# Patient Record
Sex: FEMALE | Race: BLACK OR AFRICAN AMERICAN | NOT HISPANIC OR LATINO | Employment: UNEMPLOYED | URBAN - METROPOLITAN AREA
[De-identification: names, ages, dates, MRNs, and addresses within clinical notes are randomized per-mention and may not be internally consistent; named-entity substitution may affect disease eponyms.]

---

## 2017-02-16 ENCOUNTER — OFFICE VISIT (OUTPATIENT)
Dept: FAMILY MEDICINE | Facility: CLINIC | Age: 42
End: 2017-02-16

## 2017-02-16 VITALS
DIASTOLIC BLOOD PRESSURE: 86 MMHG | BODY MASS INDEX: 32.49 KG/M2 | SYSTOLIC BLOOD PRESSURE: 136 MMHG | TEMPERATURE: 98.7 F | WEIGHT: 226.4 LBS

## 2017-02-16 DIAGNOSIS — I10 BENIGN ESSENTIAL HYPERTENSION: ICD-10-CM

## 2017-02-16 DIAGNOSIS — R05.9 COUGH: ICD-10-CM

## 2017-02-16 DIAGNOSIS — J30.2 SEASONAL ALLERGIC RHINITIS, UNSPECIFIED ALLERGIC RHINITIS TRIGGER: ICD-10-CM

## 2017-02-16 DIAGNOSIS — R07.0 THROAT PAIN: Primary | ICD-10-CM

## 2017-02-16 DIAGNOSIS — J02.0 STREP THROAT: ICD-10-CM

## 2017-02-16 LAB — S PYO AG THROAT QL IA.RAPID: POSITIVE

## 2017-02-16 RX ORDER — AMOXICILLIN 500 MG/1
500 CAPSULE ORAL 3 TIMES DAILY
Qty: 30 CAPSULE | Refills: 0 | Status: SHIPPED | OUTPATIENT
Start: 2017-02-16 | End: 2017-03-03

## 2017-02-16 RX ORDER — HYDROCHLOROTHIAZIDE 25 MG/1
12.5 TABLET ORAL DAILY
Qty: 30 TABLET | Refills: 6 | Status: SHIPPED | OUTPATIENT
Start: 2017-02-16 | End: 2017-04-26

## 2017-02-16 RX ORDER — FLUTICASONE PROPIONATE 50 MCG
1-2 SPRAY, SUSPENSION (ML) NASAL DAILY
Qty: 16 G | Refills: 6 | Status: SHIPPED | OUTPATIENT
Start: 2017-02-16 | End: 2017-03-27

## 2017-02-16 NOTE — PROGRESS NOTES
HPI:       Juanita Salgado is a 42 year old who presents for the following  Patient presents with:  Cough: 2 days, couple of her kids had positive strep  Allergies: flare up  Medication Request: flutonaise, is not effective  Derm Problem: raised nodule on inside of left thigh, painful, present for almost 1 week      Acute Illness   Concerns: cough, worsening allergy  When did it start? 2 days  Is it getting better, worse or staying the same? worse    Fever?:  YES subjective    Chills/Sweats?: No     Headache (location?) YES forehead    Sinus Pressure?:No     Conjunctivitis?: No     Ear Pain?: No     Runny nose?: No     Congestion?:  YES feels like worsening allergy    Sore Throat?:  YES      Cough?: no     Wheeze?: No     Decreased Appetite?:  YES     Nausea?:No     Vomiting?: No     Diarrhea?:  No     Dysuria/Frequency?.:No     Fatigue/Achiness?:No     Sick/Strep Exposure?: No      Therapies Tried and outcome: Nothing    A TwitJump  was used for  this visit.      Problem, Medication and Allergy Lists were reviewed and are current.  Patient is an established patient of this clinic.         Review of Systems:   Review of Systems   Constitutional: Positive for appetite change and fever (subjective). Negative for activity change.   HENT: Positive for congestion and sore throat.    Respiratory: Negative for cough and wheezing.    All other systems reviewed and are negative.            Physical Exam:   Patient Vitals for the past 24 hrs:   BP Temp Temp src Weight   02/16/17 1335 136/86 - - -   02/16/17 1331 145/85 98.7  F (37.1  C) Oral 226 lb 6.4 oz (102.7 kg)     Body mass index is 32.49 kg/(m^2).  Vitals were reviewed and were normal     Physical Exam   Constitutional: She appears well-developed and well-nourished. No distress.   HENT:   Head: Normocephalic and atraumatic.   Right Ear: External ear normal.   Left Ear: External ear normal.   Nose: Nose normal.   Mouth/Throat: Oropharynx is clear and moist.    Eyes: Conjunctivae are normal.   Neck: Normal range of motion. Neck supple.   Cardiovascular: Normal rate, regular rhythm and normal heart sounds.    No murmur heard.  Pulmonary/Chest: Effort normal and breath sounds normal. She has no wheezes. She has no rales.   Abdominal: Soft. Bowel sounds are normal. She exhibits no distension. There is no tenderness.   Lymphadenopathy:     She has cervical adenopathy.   Skin: She is not diaphoretic.         Results:     Rapid strep test positive  Assessment and Plan       ## Strep throat. Centor criteria 3/4  - Strep Screen Rapid (Group) ([facility]): POSITIVE  - amoxicillin (AMOXIL) 500 MG capsule; Take 1 capsule (500 mg) by mouth 3 times daily  Dispense: 30 capsule; Refill: 0    Refills:     ## Seasonal allergic rhinitis, unspecified allergic rhinitis trigger  - fluticasone (FLONASE) 50 MCG/ACT spray; Spray 1-2 sprays into both nostrils daily  Dispense: 16 g; Refill: 6    ## Benign essential hypertension  - hydrochlorothiazide (HYDRODIURIL) 25 MG tablet; Take 0.5 tablets (12.5 mg) by mouth daily  Dispense: 30 tablet; Refill: 6    Follow up plan: as needed      There are no discontinued medications.     Options for treatment and follow-up care were reviewed with the patient. Juanita Salgado  engaged in the decision making process and verbalized understanding of the options discussed and agreed with the final plan.    Rosa Simental MD  [facility],  G2 Family Medicine Resident  #[id number]

## 2017-02-16 NOTE — MR AVS SNAPSHOT
After Visit Summary   2/16/2017    Juanita Salgado    MRN: [id number]           Patient Information     Date Of Birth          1975        Visit Information        Provider Department      2/16/2017 1:20 PM Rosa Simental MD [facility]        Today's Diagnoses     Throat pain    -  1    Cough        Benign essential hypertension        Strep throat        Seasonal allergic rhinitis, unspecified allergic rhinitis trigger          Care Instructions    Here is the plan from today's visit:    ## Strep throat  - amoxicillin (AMOXIL) 500 MG capsule; Take 1 capsule (500 mg) by mouth 3 times daily  Dispense: 30 capsule; Refill: 0  - Strep Screen Rapid (Group) ([facility]): POSITIVE    ## Seasonal allergic rhinitis, unspecified allergic rhinitis trigger  - fluticasone (FLONASE) 50 MCG/ACT spray; Spray 1-2 sprays into both nostrils daily  Dispense: 16 g; Refill: 6      ## Benign essential hypertension    - hydrochlorothiazide (HYDRODIURIL) 25 MG tablet; Take 0.5 tablets (12.5 mg) by mouth daily  Dispense: 30 tablet; Refill: 6    Please call or return to clinic if your symptoms don't go away.    Follow up plan: as needed    Thank you for coming to [facility] today.  Lab Testing:  **If you had lab testing today and your results are reassuring or normal they will be mailed to you or sent through [employer] within 7 days.   **If the lab tests need quick action we will call you with the results.  The phone number we will call with results is # [phone] (home) . If this is not the best number please call our clinic and change the number.  Medication Refills:  If you need any refills please call your pharmacy and they will contact us.   If you need to  your refill at a new pharmacy, please contact the new pharmacy directly. The new pharmacy will help you get your medications transferred faster.   Scheduling:  If you have any concerns about today's visit or wish to schedule another  appointment please call our office during normal business hours [phone] (8-5:00 M-F)  If a referral was made to a [facility] and you don't get a call from central scheduling please call [phone].  If a Mammogram was ordered for you at [facility] call [phone] to schedule or change your appointment.  If you had an XRay/CT/Ultrasound/MRI ordered the number is [phone] to schedule or change your radiology appointment.   Medical Concerns:  If you have urgent medical concerns please call [phone] at any time of the day.    Pharyngitis: Strep (Confirmed)     You have had a positive test for strep throat. Strep throat is a contagious illness. It is spread by coughing, kissing or by touching others after touching your mouth or nose. Symptoms include throat pain which is worse with swallowing, aching all over, headache and fever. It is treated with antibiotic medication. This should help you start to feel better within 1-2 days.  Home care    Rest at home. Drink plenty of fluids to avoid dehydration.    No work or school for the first 2 days of taking the antibiotics. After this time, you will not be contagious. You can then return to school or work if you are feeling better.     The antibiotic medication must be taken for the full 10 days, even if you feel better. This is very important to ensure the infection is treated. It is also important to prevent drug-resistent organisms from developing. If you were given an antibiotic shot, no more antibiotics are needed.    You may use acetaminophen (Tylenol) or ibuprofen (Motrin, Advil) to control pain or fever, unless another medicine was prescribed for this. (NOTE: If you have chronic liver or kidney disease or ever had a stomach ulcer or GI bleeding, talk with your doctor before using these medicines.)    Throat lozenges or sprays (such as Chloraseptic) help reduce pain. Gargling with warm salt water will also reduce throat  pain. Dissolve 1/2 teaspoon of salt in 1 glass of warm water. This may be useful just before meals.     Soft foods are okay. Avoid salty or spicy foods.  Follow-up care  Follow up with your healthcare provider or our staff if you are not improving over the next week.  When to seek medical advice  Call your healthcare provider right away if any of these occur:    Fever as directed by your doctor     New or worsening ear pain, sinus pain, or headache    Painful lumps in the back of neck    Stiff neck    Lymph nodes are getting larger or becoming soft in the middle    Inability to swallow liquids, excessive drooling, or inability to open mouth wide due to throat pain    Signs of dehydration (very dark urine or no urine, sunken eyes, dizziness)    Trouble breathing or noisy breathing    Muffled voice    New rash    [id number] [employer]. [address] [place] [address]. All rights reserved. This information is not intended as a substitute for professional medical care. Always follow your healthcare professional's instructions.              Follow-ups after your visit        Your next 10 appointments already scheduled     Mar 10, 2017 11:00 AM CST   (Arrive by 10:45 AM)   Return Visit with BETTY Isabel [facility] Neurology ([facility])    [address] [facility]  [address] [facility] [id number] [phone]            Apr 06, 2017  1:15 PM CDT   RETURN GENERAL with Everett Anguiano MD   Eye Clinic ([facility])    [facility]  [address] [facility]  [address] [address]  [facility] [id number] [phone]              Who to contact     Please call your clinic at [phone] to:    Ask questions about your health    Make or cancel appointments    Discuss your medicines    Learn about your test results    Speak to your doctor   If you have compliments or concerns about an experience at your clinic, or if you wish to file a  complaint, please contact [facility] Patient Relations at [phone] or email us at [email or web address]         Additional Information About Your Visit        [employer] Information     [employer] is an electronic gateway that provides easy, online access to your medical records. With [employer], you can request a clinic appointment, read your test results, renew a prescription or communicate with your care team.     To sign up for [employer] visit the website at [email or web address]   You will be asked to enter the access code listed below, as well as some personal information. Please follow the directions to create your username and password.     Your access code is: [email or web address]  Expires: 3/26/2017  6:30 AM     Your access code will  in 90 days. If you need help or a new code, please contact your [facility] or call [phone] for assistance.        Care EveryWhere ID     This is your Care EveryWhere ID. This could be used by other organizations to access your [place] medical records  [id number]        Your Vitals Were     Temperature BMI (Body Mass Index)                98.7  F (37.1  C) (Oral) 32.49 kg/m2           Blood Pressure from Last 3 Encounters:   17 136/86   16 (!) 141/91   16 126/84    Weight from Last 3 Encounters:   17 226 lb 6.4 oz (102.7 kg)   16 231 lb 14.4 oz (105.2 kg)   16 224 lb 6.4 oz (101.8 kg)              We Performed the Following     Strep Screen Rapid (Group) ([facility])          Today's Medication Changes          These changes are accurate as of: 17  2:10 PM.  If you have any questions, ask your nurse or doctor.               Start taking these medicines.        Dose/Directions    amoxicillin 500 MG capsule   Commonly known as:  AMOXIL   Used for:  Strep throat   Started by:  Rosa Simental MD        Dose:  500 mg   Take 1 capsule (500 mg) by mouth 3 times daily    Quantity:  30 capsule   Refills:  0            Where to get your medicines      These medications were sent to [facility] [place], [place] - [address] [facility] [address]     Phone:  [phone]     amoxicillin 500 MG capsule    fluticasone 50 MCG/ACT spray    hydrochlorothiazide 25 MG tablet                Primary Care Provider Office Phone # Fax #    Aminata Tamie Molina [phone]       [facility] [address]  [facility] [address]        Thank you!     Thank you for choosing [facility]  for your care. Our goal is always to provide you with excellent care. Hearing back from our patients is one way we can continue to improve our services. Please take a few minutes to complete the written survey that you may receive in the mail after your visit with us. Thank you!             Your Updated Medication List - Protect others around you: Learn how to safely use, store and throw away your medicines at www.disposemymeds.org.          This list is accurate as of: 2/16/17  2:10 PM.  Always use your most recent med list.                   Brand Name Dispense Instructions for use    acetaminophen 500 MG tablet    TYLENOL    100 tablet    Take 2 tablets (1,000 mg) by mouth every 6 hours as needed for mild pain       Adult Blood Pressure Cuff Lg Kit     1 kit    1 Device as needed       amitriptyline 25 MG tablet    ELAVIL    30 tablet    Take 1 tablet (25 mg) by mouth At Bedtime       amLODIPine 5 MG tablet    NORVASC    30 tablet    Take 1 tablet (5 mg) by mouth daily       amoxicillin 500 MG capsule    AMOXIL    30 capsule    Take 1 capsule (500 mg) by mouth 3 times daily       ARTIFICIAL TEAR OP      Apply 1 drop to eye daily as needed       atorvastatin 80 MG tablet    LIPITOR    90 tablet    Take 1 tablet (80 mg) by mouth daily       carboxymethylcellulose 0.5 % Soln ophthalmic solution    REFRESH PLUS    1 Bottle    Place 1  drop into both eyes 3 times daily as needed for dry eyes       cetirizine 10 MG tablet    zyrTEC    30 tablet    Take 1 tablet (10 mg) by mouth every evening       fluticasone 50 MCG/ACT spray    FLONASE    16 g    Spray 1-2 sprays into both nostrils daily       hydrochlorothiazide 25 MG tablet    HYDRODIURIL    30 tablet    Take 0.5 tablets (12.5 mg) by mouth daily       ibuprofen 800 MG tablet    ADVIL/MOTRIN    60 tablet    Take 1 tablet (800 mg) by mouth every 6 hours as needed for moderate pain       ketotifen 0.025 % Soln ophthalmic solution    ZADITOR    1 Bottle    Place 1 drop into both eyes every 12 hours       loratadine 10 MG tablet    CLARITIN    30 tablet    Take 1 tablet (10 mg) by mouth daily       multivitamin, therapeutic with minerals Tabs tablet     100 tablet    Take 1 tablet by mouth daily       riboflavin 400 MG Caps     30 capsule    Take 400 mg by mouth daily       SUMAtriptan 25 MG tablet    IMITREX    10 tablet    Take 1 tablet (25 mg) by mouth at onset of headache for migraine May repeat in 2 hours. Max 8 tablets/24 hours.       vitamin D 2000 UNITS tablet     100 tablet    Take 1 tablet by mouth daily

## 2017-02-16 NOTE — PATIENT INSTRUCTIONS
Here is the plan from today's visit:    ## Strep throat  - amoxicillin (AMOXIL) 500 MG capsule; Take 1 capsule (500 mg) by mouth 3 times daily  Dispense: 30 capsule; Refill: 0  - Strep Screen Rapid (Group) ([facility]): POSITIVE    ## Seasonal allergic rhinitis, unspecified allergic rhinitis trigger  - fluticasone (FLONASE) 50 MCG/ACT spray; Spray 1-2 sprays into both nostrils daily  Dispense: 16 g; Refill: 6      ## Benign essential hypertension    - hydrochlorothiazide (HYDRODIURIL) 25 MG tablet; Take 0.5 tablets (12.5 mg) by mouth daily  Dispense: 30 tablet; Refill: 6    Please call or return to clinic if your symptoms don't go away.    Follow up plan: as needed    Thank you for coming to [facility] today.  Lab Testing:  **If you had lab testing today and your results are reassuring or normal they will be mailed to you or sent through [employer] within 7 days.   **If the lab tests need quick action we will call you with the results.  The phone number we will call with results is # [phone] (home) . If this is not the best number please call our clinic and change the number.  Medication Refills:  If you need any refills please call your pharmacy and they will contact us.   If you need to  your refill at a new pharmacy, please contact the new pharmacy directly. The new pharmacy will help you get your medications transferred faster.   Scheduling:  If you have any concerns about today's visit or wish to schedule another appointment please call our office during normal business hours [phone] (8-5:00 M-F)  If a referral was made to a [facility] and you don't get a call from central scheduling please call [phone].  If a Mammogram was ordered for you at [facility] call [phone] to schedule or change your appointment.  If you had an XRay/CT/Ultrasound/MRI ordered the number is [phone] to schedule or change your radiology appointment.   Medical Concerns:  If you  have urgent medical concerns please call [phone] at any time of the day.    Pharyngitis: Strep (Confirmed)     You have had a positive test for strep throat. Strep throat is a contagious illness. It is spread by coughing, kissing or by touching others after touching your mouth or nose. Symptoms include throat pain which is worse with swallowing, aching all over, headache and fever. It is treated with antibiotic medication. This should help you start to feel better within 1-2 days.  Home care    Rest at home. Drink plenty of fluids to avoid dehydration.    No work or school for the first 2 days of taking the antibiotics. After this time, you will not be contagious. You can then return to school or work if you are feeling better.     The antibiotic medication must be taken for the full 10 days, even if you feel better. This is very important to ensure the infection is treated. It is also important to prevent drug-resistent organisms from developing. If you were given an antibiotic shot, no more antibiotics are needed.    You may use acetaminophen (Tylenol) or ibuprofen (Motrin, Advil) to control pain or fever, unless another medicine was prescribed for this. (NOTE: If you have chronic liver or kidney disease or ever had a stomach ulcer or GI bleeding, talk with your doctor before using these medicines.)    Throat lozenges or sprays (such as Chloraseptic) help reduce pain. Gargling with warm salt water will also reduce throat pain. Dissolve 1/2 teaspoon of salt in 1 glass of warm water. This may be useful just before meals.     Soft foods are okay. Avoid salty or spicy foods.  Follow-up care  Follow up with your healthcare provider or our staff if you are not improving over the next week.  When to seek medical advice  Call your healthcare provider right away if any of these occur:    Fever as directed by your doctor     New or worsening ear pain, sinus pain, or headache    Painful lumps in the back of  neck    Stiff neck    Lymph nodes are getting larger or becoming soft in the middle    Inability to swallow liquids, excessive drooling, or inability to open mouth wide due to throat pain    Signs of dehydration (very dark urine or no urine, sunken eyes, dizziness)    Trouble breathing or noisy breathing    Muffled voice    New rash    [id number] [employer]. [address] [place] [address]. All rights reserved. This information is not intended as a substitute for professional medical care. Always follow your healthcare professional's instructions.

## 2017-02-19 ASSESSMENT — ENCOUNTER SYMPTOMS
COUGH: 0
ACTIVITY CHANGE: 0
SORE THROAT: 1
FEVER: 1
APPETITE CHANGE: 1
WHEEZING: 0

## 2017-03-03 ENCOUNTER — OFFICE VISIT (OUTPATIENT)
Dept: FAMILY MEDICINE | Facility: CLINIC | Age: 42
End: 2017-03-03

## 2017-03-03 VITALS
SYSTOLIC BLOOD PRESSURE: 138 MMHG | DIASTOLIC BLOOD PRESSURE: 89 MMHG | WEIGHT: 227.4 LBS | HEART RATE: 85 BPM | OXYGEN SATURATION: 99 % | TEMPERATURE: 98.5 F | BODY MASS INDEX: 32.63 KG/M2

## 2017-03-03 DIAGNOSIS — R10.2 CHRONIC PELVIC PAIN IN FEMALE: ICD-10-CM

## 2017-03-03 DIAGNOSIS — G89.29 CHRONIC PELVIC PAIN IN FEMALE: ICD-10-CM

## 2017-03-03 DIAGNOSIS — L24.3 IRRITANT CONTACT DERMATITIS DUE TO COSMETICS: Primary | ICD-10-CM

## 2017-03-03 RX ORDER — DIAPER,BRIEF,INFANT-TODD,DISP
EACH MISCELLANEOUS
Qty: 30 G | Refills: 0 | Status: SHIPPED | OUTPATIENT
Start: 2017-03-03 | End: 2017-03-29

## 2017-03-03 NOTE — MR AVS SNAPSHOT
After Visit Summary   3/3/2017    Juanita Salgado    MRN: [id number]           Patient Information     Date Of Birth          1975        Visit Information        Provider Department      3/3/2017 11:20 AM Lake Fernando MD [facility]        Today's Diagnoses     Irritant contact dermatitis due to cosmetics    -  1    Chronic pelvic pain in female          Care Instructions    Here is the plan from today's visit    1. Irritant contact dermatitis due to cosmetics  - hydrocortisone 1 % ointment; Apply sparingly to affected area three times daily for 14 days.  Dispense: 30 g; Refill: 0    2. Chronic pelvic pain in female  - OB/GYN REFERRAL        Please call or return to clinic if your symptoms don't go away.    Follow up plan  Follow up in 3-4 weeks for hypertension and labs    Thank you for coming to [facility] today.  Lab Testing:  **If you had lab testing today and your results are reassuring or normal they will be mailed to you or sent through [employer] within 7 days.   **If the lab tests need quick action we will call you with the results.  The phone number we will call with results is # [phone] (home) . If this is not the best number please call our clinic and change the number.  Medication Refills:  If you need any refills please call your pharmacy and they will contact us.   If you need to  your refill at a new pharmacy, please contact the new pharmacy directly. The new pharmacy will help you get your medications transferred faster.   Scheduling:  If you have any concerns about today's visit or wish to schedule another appointment please call our office during normal business hours [phone] (8-5:00 M-F)  If a referral was made to a [facility] and you don't get a call from central scheduling please call [phone].  If a Mammogram was ordered for you at [facility] call [phone] to schedule or change your  appointment.  If you had an XRay/CT/Ultrasound/MRI ordered the number is [phone] to schedule or change your radiology appointment.   Medical Concerns:  If you have urgent medical concerns please call [phone] at any time of the day.  If you have a medical emergency please call 911.        Follow-ups after your visit        Additional Services     OB/GYN REFERRAL       Your provider has referred you to:  FMG: [facility] [phone]   [email or web address]    Please be aware that coverage of these services is subject to the terms and limitations of your health insurance plan.  Call member services at your health plan with any benefit or coverage questions.      Please bring the following with you to your appointment:    (1) Any X-Rays, CTs or MRIs which have been performed.  Contact the facility where they were done to arrange for  prior to your scheduled appointment.   (2) List of current medications   (3) This referral request   (4) Any documents/labs given to you for this referral                  Your next 10 appointments already scheduled     Mar 10, 2017 11:00 AM CST   (Arrive by 10:45 AM)   Return Visit with BETTY Isabel [facility] Neurology ([facility] [place])    [address] [facility] [address] [phone]            Apr 06, 2017  1:15 PM CDT   RETURN GENERAL with Everett Anguiano MD   Eye Clinic ([facility])    Graeme Garzon [facility]  [address]  [address] [address]  [facility] [id number] [phone]              Who to contact     Please call your clinic at [phone] to:    Ask questions about your health    Make or cancel appointments    Discuss your medicines    Learn about your test results    Speak to your doctor   If you have compliments or concerns about an experience at your clinic, or if you wish to file a complaint, please contact  [facility] Patient Relations at [phone] or email us at [email or web address]         Additional Information About Your Visit        [employer] Information     [employer] is an electronic gateway that provides easy, online access to your medical records. With [employer], you can request a clinic appointment, read your test results, renew a prescription or communicate with your care team.     To sign up for [employer] visit the website at [email or web address]   You will be asked to enter the access code listed below, as well as some personal information. Please follow the directions to create your username and password.     Your access code is: [email or web address]  Expires: 3/26/2017  6:30 AM     Your access code will  in 90 days. If you need help or a new code, please contact your [facility] or call [phone] for assistance.        Care EveryWhere ID     This is your Care EveryWhere ID. This could be used by other organizations to access your [place] medical records  [id number]        Your Vitals Were     Pulse Temperature Last Period Pulse Oximetry Breastfeeding? BMI (Body Mass Index)    85 98.5  F (36.9  C) (Oral) 2017 99% No 32.63 kg/m2       Blood Pressure from Last 3 Encounters:   17 138/89   17 136/86   16 (!) 141/91    Weight from Last 3 Encounters:   17 227 lb 6.4 oz (103.1 kg)   17 226 lb 6.4 oz (102.7 kg)   16 231 lb 14.4 oz (105.2 kg)              We Performed the Following     OB/GYN REFERRAL          Today's Medication Changes          These changes are accurate as of: 3/3/17 12:11 PM.  If you have any questions, ask your nurse or doctor.               Start taking these medicines.        Dose/Directions    hydrocortisone 1 % ointment   Used for:  Irritant contact dermatitis due to cosmetics   Started by:  Lake Fernando MD        Apply sparingly to affected area three times daily for 14  days.   Quantity:  30 g   Refills:  0            Where to get your medicines      These medications were sent to [facility] [place], [place] - [address], [facility] [address]     Phone:  [phone]     hydrocortisone 1 % ointment                Primary Care Provider Office Phone # Fax #    Aminata Tamie Molina [phone]       [facility] [address]  [facility] [address]        Thank you!     Thank you for choosing [facility]  for your care. Our goal is always to provide you with excellent care. Hearing back from our patients is one way we can continue to improve our services. Please take a few minutes to complete the written survey that you may receive in the mail after your visit with us. Thank you!             Your Updated Medication List - Protect others around you: Learn how to safely use, store and throw away your medicines at www.disposemymeds.org.          This list is accurate as of: 3/3/17 12:11 PM.  Always use your most recent med list.                   Brand Name Dispense Instructions for use    acetaminophen 500 MG tablet    TYLENOL    100 tablet    Take 2 tablets (1,000 mg) by mouth every 6 hours as needed for mild pain       Adult Blood Pressure Cuff Lg Kit     1 kit    1 Device as needed       amitriptyline 25 MG tablet    ELAVIL    30 tablet    Take 1 tablet (25 mg) by mouth At Bedtime       amLODIPine 5 MG tablet    NORVASC    30 tablet    Take 1 tablet (5 mg) by mouth daily       ARTIFICIAL TEAR OP      Apply 1 drop to eye daily as needed       atorvastatin 80 MG tablet    LIPITOR    90 tablet    Take 1 tablet (80 mg) by mouth daily       carboxymethylcellulose 0.5 % Soln ophthalmic solution    REFRESH PLUS    1 Bottle    Place 1 drop into both eyes 3 times daily as needed for dry eyes       cetirizine 10 MG tablet    zyrTEC    30 tablet    Take 1 tablet (10 mg) by mouth every evening        fluticasone 50 MCG/ACT spray    FLONASE    16 g    Spray 1-2 sprays into both nostrils daily       hydrochlorothiazide 25 MG tablet    HYDRODIURIL    30 tablet    Take 0.5 tablets (12.5 mg) by mouth daily       hydrocortisone 1 % ointment     30 g    Apply sparingly to affected area three times daily for 14 days.       ibuprofen 800 MG tablet    ADVIL/MOTRIN    60 tablet    Take 1 tablet (800 mg) by mouth every 6 hours as needed for moderate pain       ketotifen 0.025 % Soln ophthalmic solution    ZADITOR    1 Bottle    Place 1 drop into both eyes every 12 hours       loratadine 10 MG tablet    CLARITIN    30 tablet    Take 1 tablet (10 mg) by mouth daily       multivitamin, therapeutic with minerals Tabs tablet     100 tablet    Take 1 tablet by mouth daily       riboflavin 400 MG Caps     30 capsule    Take 400 mg by mouth daily       SUMAtriptan 25 MG tablet    IMITREX    10 tablet    Take 1 tablet (25 mg) by mouth at onset of headache for migraine May repeat in 2 hours. Max 8 tablets/24 hours.       vitamin D 2000 UNITS tablet     100 tablet    Take 1 tablet by mouth daily

## 2017-03-03 NOTE — NURSING NOTE
used this visit:  Name: Isela Trinidad  Language: Sammarinese  Agency: [employer]  Phone:[phone]

## 2017-03-03 NOTE — PROGRESS NOTES
HPI:       Juanita Salgado is a 42 year old who presents for the following  Patient presents with:  Abdominal Pain: Stomach pain that is ongoing for the last year.  She has been seeing a specialist for this over at [place].  Patient indicates the problems happened after she had a miscarriage  Derm Problem: Skin rash that comes and goes for the last year also    Pelvic pain: Report she has been having pelvic pain for the past one year. Notices significant pelvic pain 3 days prior to menses and then one week after menses. Positive for dyspareunia. She has been taking ibuprofen to help with pain. She has regular menstrual cycle. She was diagnosed with complex cyst in Dec 2015 and was offered . She would like to be seen by OBGYN for possible     Skin rash; Report she has been dealing with intermittent skin rash for the past one year.  She has small bump on her arms and legs. She applied korin couple weeks ago. She also notices small nodule on abdominal folds for the past 2 weeks and improving.        Problem, Medication and Allergy Lists were reviewed and are current.  Patient is an established patient of this clinic.         Review of Systems:   Review of Systems   Constitutional: Negative for diaphoresis, fatigue and fever.   Respiratory: Negative for cough and shortness of breath.    Cardiovascular: Negative for chest pain, palpitations and leg swelling.   Genitourinary: Positive for dyspareunia and pelvic pain. Negative for difficulty urinating, dysuria, menstrual problem, vaginal bleeding and vaginal discharge.   Skin: Positive for rash.             Physical Exam:   Patient Vitals for the past 24 hrs:   BP Temp Temp src Pulse SpO2 Weight   03/03/17 1114 138/89 98.5  F (36.9  C) Oral 85 99 % 227 lb 6.4 oz (103.1 kg)     Body mass index is 32.63 kg/(m^2).  Vitals were reviewed and were normal     Physical Exam   Constitutional: She is oriented to person, place, and time. She appears well-developed and  well-nourished. No distress.   HENT:   Head: Normocephalic and atraumatic.   Cardiovascular: Normal rate, regular rhythm and normal heart sounds.    No murmur heard.  Pulmonary/Chest: Effort normal and breath sounds normal. No respiratory distress. She has no wheezes. She has no rales.   Abdominal: Soft. Bowel sounds are normal. She exhibits no distension. There is tenderness in the suprapubic area. There is no rigidity, no rebound and no guarding.   Obese   Neurological: She is alert and oriented to person, place, and time.   Skin:   Small raised erythematous papules on the arms and leg. 2 cm of indurated abscess on the abdominal fold.        Results:      Results from the last 24 hoursNo results found for this or any previous visit (from the past 24 hour(s)).  Assessment and Plan     Juanita was seen today for abdominal pain and derm problem.    Diagnoses and all orders for this visit:    Irritant contact dermatitis due to cosmetics  -Patient education and reassurance provided.   -Advised to avoid korin completely.    -Discussed with the patient that small abscess due to skin irritation from the abdominal folds.  -Continue conservative management.   -Will do trial of hydrocortisone ointment   -     hydrocortisone 1 % ointment; Apply sparingly to affected area three times daily for 14 days.    Chronic pelvic pain in female: She has been dealing with chronic pelvic pain. She had history of complex cyst in the past and would like to get second opinion.   -     OB/GYN REFERRAL      Medications Discontinued During This Encounter   Medication Reason     amoxicillin (AMOXIL) 500 MG capsule Therapy completed     Options for treatment and follow-up care were reviewed with the patient. Juanita Salgado  engaged in the decision making process and verbalized understanding of the options discussed and agreed with the final plan.    Lake Fernando MD  PGY 3 [facility]  [phone](pg)

## 2017-03-03 NOTE — PROGRESS NOTES
Preceptor Attestation:   Patient seen and discussed with the resident.   Assessment and plan reviewed with resident and agreed upon.   Supervising Physician:  Mark Ochoa MD  [facility]

## 2017-03-06 ASSESSMENT — ENCOUNTER SYMPTOMS
PALPITATIONS: 0
COUGH: 0
DIAPHORESIS: 0
FEVER: 0
DYSURIA: 0
FATIGUE: 0
SHORTNESS OF BREATH: 0
DIFFICULTY URINATING: 0

## 2017-03-15 ENCOUNTER — OFFICE VISIT (OUTPATIENT)
Dept: FAMILY MEDICINE | Facility: CLINIC | Age: 42
End: 2017-03-15

## 2017-03-15 VITALS
BODY MASS INDEX: 32.77 KG/M2 | DIASTOLIC BLOOD PRESSURE: 90 MMHG | WEIGHT: 228.4 LBS | SYSTOLIC BLOOD PRESSURE: 133 MMHG | TEMPERATURE: 98.5 F | OXYGEN SATURATION: 96 % | HEART RATE: 84 BPM

## 2017-03-15 VITALS
HEART RATE: 84 BPM | SYSTOLIC BLOOD PRESSURE: 133 MMHG | DIASTOLIC BLOOD PRESSURE: 90 MMHG | WEIGHT: 228 LBS | TEMPERATURE: 98.5 F | BODY MASS INDEX: 32.71 KG/M2 | OXYGEN SATURATION: 96 %

## 2017-03-15 DIAGNOSIS — R10.2 VAGINAL PAIN: Primary | ICD-10-CM

## 2017-03-15 DIAGNOSIS — B37.31 CANDIDIASIS OF VAGINA: ICD-10-CM

## 2017-03-15 DIAGNOSIS — R21 RASH AND NONSPECIFIC SKIN ERUPTION: ICD-10-CM

## 2017-03-15 DIAGNOSIS — N75.0 BARTHOLIN GLAND CYST: Primary | ICD-10-CM

## 2017-03-15 RX ORDER — IBUPROFEN 600 MG/1
600 TABLET, FILM COATED ORAL EVERY 6 HOURS PRN
Qty: 60 TABLET | Refills: 0 | Status: SHIPPED | OUTPATIENT
Start: 2017-03-15 | End: 2017-03-29

## 2017-03-15 RX ORDER — FLUCONAZOLE 150 MG/1
150 TABLET ORAL ONCE
Qty: 1 TABLET | Refills: 0 | Status: SHIPPED | OUTPATIENT
Start: 2017-03-15 | End: 2017-03-15

## 2017-03-15 ASSESSMENT — ENCOUNTER SYMPTOMS
FATIGUE: 1
SHORTNESS OF BREATH: 0
ADENOPATHY: 0
NAUSEA: 0
WEAKNESS: 1
SORE THROAT: 0
RHINORRHEA: 0
ABDOMINAL PAIN: 0
FEVER: 1
VOMITING: 0

## 2017-03-15 NOTE — PATIENT INSTRUCTIONS
Bartholin s Cyst: Common Treatments  The Bartholin s glands are a pair of very small glands found inside the labia (vaginal lips). (There is one on either side.) The glands produce fluid to help keep the vagina moist. When the opening of a Bartholin s gland becomes blocked, the gland may swell and form a cyst. The cyst may vary in size from small to large (1 to 3 cm in size). It may feel like a firm lump within the labia.  Treatment depends on various factors. This includes the size of the cyst, whether it causes symptoms, and whether it s infected. Small and/or uninfected cysts don t usually need treatment. Large cysts and those that are painful or infected will likely need treatment. Below are some common treatments that may be done:    Incision and drainage: With this procedure, the area over the cyst is numbed. The cyst is cut and drained. Often, a thin tube with a balloon on the end (called a Word catheter) is then inserted into the empty cyst. This allows for further drainage of fluid. The catheter is left in place for 4 to 6 weeks. It is then removed by the healthcare provider.    Marsupialization: With this procedure, the area over the cyst is numbed. The cyst is cut and drained. The edges of the skin are then stitched to create a small opening that will allow for further drainage of fluid.  Note: If you have recurrent cysts, other treatments may be needed. Your provider can tell you more about these options.  If your cyst is infected, antibiotics will likely be prescribed. Most infections of Bartholin s cysts are due to bacteria that are normally present in the vagina. Sometimes, the bacteria may have been passed to you during sex. This is called a sexually transmitted disease (STD). A test (culture) of the fluid can confirm if you have an STD.  Home care  Medicines    If antibiotics are prescribed, be sure to take them exactly as directed. Don t stop taking the medicine, even if you start to feel  better. Note: If the cause of your infection is an STD, any sexual partners you have will also need to be tested and treated.    If you have pain, use over-the-counter pain medicine as directed. If needed, stronger pain medicines can be prescribed.   General care    To help relieve discomfort, you may be advised to take sitz baths for the next few days. For this, you soak in bath filled with 2 to 3 inches of warm water for 10 to 15 minutes, a few times a day.    Avoid having sex until the cyst has healed. If the cause of your infection is an STD, also avoid having sex until you and any partners you have are done with treatment.  Follow-up care  Follow up with your healthcare provider as directed. Be sure to keep all appointments. These are needed to ensure that you are recovering well after your treatment. If you have a catheter, your provider will let you know when to return to have it removed.  When to seek medical advice  Call your healthcare provider right away if any of these occur:    Fever does not go down or worsens    Increased redness, pain, swelling, or foul-smelling drainage from the cyst or the area around it    Pain in the lower abdomen     New rash or joint pain    Catheter falls out before the scheduled time to remove it (only if a Word catheter was placed)    0097-7641 [employer]. [address] [place] [address], [place] [id number]. All rights reserved. This information is not intended as a substitute for professional medical care. Always follow your healthcare professional's instructions.    Take Diflucan 1 tablet one time for yeast infection   Follow up with me Dr. Phelps in 1 week  No sexual intercourse until catheter falls off  Catheter should be in 4 weeks but okay if it falls earlier than that

## 2017-03-15 NOTE — PATIENT INSTRUCTIONS
Here is the plan from today's visit    1. Vaginal pain  - ibuprofen (ADVIL/MOTRIN) 600 MG tablet; Take 1 tablet (600 mg) by mouth every 6 hours as needed for moderate pain  Dispense: 60 tablet; Refill: 0  - [facility] INTERNAL REFERRAL    2. Rash and nonspecific skin eruption  - diphenhydrAMINE-zinc acetate (BENADRYL) cream; Apply topically 3 times daily as needed for itching or irritation  Dispense: 28 g; Refill: 0  -Apply warm packs at least 3-4 times a day for 15 minutes, warm sitz baths     Please call or return to clinic if your symptoms don't go away.    Follow up plan  Follow up if you are not improving in the next 1 week.    Thank you for coming to [facility] today.  Lab Testing:  **If you had lab testing today and your results are reassuring or normal they will be mailed to you or sent through [employer] within 7 days.   **If the lab tests need quick action we will call you with the results.  The phone number we will call with results is # [phone] (home) . If this is not the best number please call our clinic and change the number.  Medication Refills:  If you need any refills please call your pharmacy and they will contact us.   If you need to  your refill at a new pharmacy, please contact the new pharmacy directly. The new pharmacy will help you get your medications transferred faster.   Scheduling:  If you have any concerns about today's visit or wish to schedule another appointment please call our office during normal business hours [phone] (8-5:00 M-F)  If a referral was made to a [facility] and you don't get a call from central scheduling please call [phone].  If a Mammogram was ordered for you at [facility] call [phone] to schedule or change your appointment.  If you had an XRay/CT/Ultrasound/MRI ordered the number is [phone] to schedule or change your radiology appointment.   Medical Concerns:  If you have urgent medical  concerns please call [phone] at any time of the day.

## 2017-03-15 NOTE — PROGRESS NOTES
I was present for and supervised this entire procedure. I agree with the above documentation.    Mark Ochoa MD

## 2017-03-15 NOTE — PROGRESS NOTES
Preceptor Attestation:   Patient seen and discussed with the resident. Assessment and plan reviewed with resident and agreed upon.   Supervising Physician:  Vitor Stewart MD  [facility]

## 2017-03-15 NOTE — PROGRESS NOTES
[facility]  Incision and Drainage  Note    Juanita Salgado is a patient of Aminata Charles here for incision and drainage with word's catheter insertion for a right bartholin cyst.     Consent: Affirmation of informed consent was signed and scanned into the medical record. Risks, benefits and alternatives were discussed. Patient's questions were elicited and answered.   Procedure safety checklist was completed:  Yes  Time Out (Pause for the Cause) completed: Yes    Preoperative Diagnosis: Right Bartholin cyst .                                          Vaginal Candidiasis   Postoperative Diagnosis: same       The perineal area  was prepped  in the usual sterile fashion. INJECTION:  Using 2 mL of 1% lidocaine was given. Center of the cyst excise with a blade 11 and cyst was explored with a curved aaron.  Evacuated approximately 2 cc of purulent material. Word's catheter was then placed after evacuation of cyst material sasha inflated with 1.5 cc of saline water.     Technique:   Skin prep Betadine  Anesthesia 2% lidocaine, without epi  Complications:  No  Tolerance:  Pt tolerated procedure well and was in stable condition.     Follow up: Patient was instructed to use ice on the affected area tonight for at least 30 minutes and  that there will be a return to pain in a couple hours followed by relief in the next several days to a week. Patient was advised to call if increasing redness and swelling.     Follow up in 1 week with me.   Advised no sexual intercourse for 4 weeks or until the wound has healed.   Reassured that it is okay if catheter falls out.  May use Tylenol or Ibuprofen for pain .   Will prescribe Diflucan 150 mg once for visible yeast vaginitis.      Faculty: Mark Ochoa MD present for and supervised this entire procedure.      Genna Phelps MD  PGY 3 [facility]  Family Medicine  Pager # [phone]

## 2017-03-15 NOTE — MR AVS SNAPSHOT
After Visit Summary   3/15/2017    Juanita Salgado    MRN: [id number]           Patient Information     Date Of Birth          1975        Visit Information        Provider Department      3/15/2017 3:00 PM Mark Ochoa MD [facility]        Today's Diagnoses     Candidiasis of vagina    -  1      Care Instructions      Bartholin s Cyst: Common Treatments  The Bartholin s glands are a pair of very small glands found inside the labia (vaginal lips). (There is one on either side.) The glands produce fluid to help keep the vagina moist. When the opening of a Bartholin s gland becomes blocked, the gland may swell and form a cyst. The cyst may vary in size from small to large (1 to 3 cm in size). It may feel like a firm lump within the labia.  Treatment depends on various factors. This includes the size of the cyst, whether it causes symptoms, and whether it s infected. Small and/or uninfected cysts don t usually need treatment. Large cysts and those that are painful or infected will likely need treatment. Below are some common treatments that may be done:    Incision and drainage: With this procedure, the area over the cyst is numbed. The cyst is cut and drained. Often, a thin tube with a balloon on the end (called a Word catheter) is then inserted into the empty cyst. This allows for further drainage of fluid. The catheter is left in place for 4 to 6 weeks. It is then removed by the healthcare provider.    Marsupialization: With this procedure, the area over the cyst is numbed. The cyst is cut and drained. The edges of the skin are then stitched to create a small opening that will allow for further drainage of fluid.  Note: If you have recurrent cysts, other treatments may be needed. Your provider can tell you more about these options.  If your cyst is infected, antibiotics will likely be prescribed. Most infections of Bartholin s cysts are due to bacteria that are normally  present in the vagina. Sometimes, the bacteria may have been passed to you during sex. This is called a sexually transmitted disease (STD). A test (culture) of the fluid can confirm if you have an STD.  Home care  Medicines    If antibiotics are prescribed, be sure to take them exactly as directed. Don t stop taking the medicine, even if you start to feel better. Note: If the cause of your infection is an STD, any sexual partners you have will also need to be tested and treated.    If you have pain, use over-the-counter pain medicine as directed. If needed, stronger pain medicines can be prescribed.   General care    To help relieve discomfort, you may be advised to take sitz baths for the next few days. For this, you soak in bath filled with 2 to 3 inches of warm water for 10 to 15 minutes, a few times a day.    Avoid having sex until the cyst has healed. If the cause of your infection is an STD, also avoid having sex until you and any partners you have are done with treatment.  Follow-up care  Follow up with your healthcare provider as directed. Be sure to keep all appointments. These are needed to ensure that you are recovering well after your treatment. If you have a catheter, your provider will let you know when to return to have it removed.  When to seek medical advice  Call your healthcare provider right away if any of these occur:    Fever does not go down or worsens    Increased redness, pain, swelling, or foul-smelling drainage from the cyst or the area around it    Pain in the lower abdomen     New rash or joint pain    Catheter falls out before the scheduled time to remove it (only if a Word catheter was placed)    8788-0154 [employer]. [address] [place] [address], [place] [address]. All rights reserved. This information is not intended as a substitute for professional medical care. Always follow your healthcare professional's instructions.    Take Diflucan 1 tablet one time for yeast infection    Follow up with me Dr. Phelps in 1 week  No sexual intercourse until catheter falls off  Catheter should be in 4 weeks but okay if it falls earlier than that           Follow-ups after your visit        Your next 10 appointments already scheduled     Mar 29, 2017  1:15 PM CDT   New Patient Visit with Rosa Day MD   [facility] ([facility])    [facility] [address]  [address]  [facility] [address]   [phone]            Mar 29, 2017  4:00 PM CDT   Return Visit with Lake Fernando MD   [facility])    [address],  [address]  [facility] [address]   [phone]            Apr 06, 2017  1:15 PM CDT   RETURN GENERAL with Everett Anguiano MD   [facility] ([facility])    [address]  [address] [facility]  [address]  [facility] [address] [phone]              Who to contact     Please call your clinic at [phone] to:    Ask questions about your health    Make or cancel appointments    Discuss your medicines    Learn about your test results    Speak to your doctor   If you have compliments or concerns about an experience at your clinic, or if you wish to file a complaint, please contact [facility] Patient Relations at [phone] or email us at [email or web address]         Additional Information About Your Visit        MyChart Information     [email or web address] is an electronic gateway that provides easy, online access to your medical records. With [email or web address], you can request a clinic appointment, read your test results, renew a prescription or communicate with your care team.     To sign up for [email or web address] visit the website at [email or web address]   You will be asked to enter the access code listed below, as well as some personal information. Please follow the directions to create your username and password.     Your  access code is: [email or web address]  Expires: 3/26/2017  7:30 AM     Your access code will  in 90 days. If you need help or a new code, please contact your [facility] or call [phone] for assistance.        Care EveryWhere ID     This is your Care EveryWhere ID. This could be used by other organizations to access your [place] medical records  [id number]        Your Vitals Were     Pulse Temperature Last Period Pulse Oximetry BMI (Body Mass Index)       84 98.5  F (36.9  C) (Oral) 2017 96% 32.71 kg/m2        Blood Pressure from Last 3 Encounters:   03/15/17 133/90   03/15/17 133/90   17 138/89    Weight from Last 3 Encounters:   03/15/17 228 lb (103.4 kg)   03/15/17 228 lb 6.4 oz (103.6 kg)   17 227 lb 6.4 oz (103.1 kg)              Today, you had the following     No orders found for display         Today's Medication Changes          These changes are accurate as of: 3/15/17  3:50 PM.  If you have any questions, ask your nurse or doctor.               Start taking these medicines.        Dose/Directions    diphenhydrAMINE-zinc acetate cream   Commonly known as:  BENADRYL   Used for:  Rash and nonspecific skin eruption   Started by:  Aviva Braun MD        Apply topically 3 times daily as needed for itching or irritation   Quantity:  28 g   Refills:  0       fluconazole 150 MG tablet   Commonly known as:  DIFLUCAN   Used for:  Candidiasis of vagina   Started by:  Mark Ochoa MD        Dose:  150 mg   Take 1 tablet (150 mg) by mouth once for 1 dose   Quantity:  1 tablet   Refills:  0         These medicines have changed or have updated prescriptions.        Dose/Directions    * ibuprofen 800 MG tablet   Commonly known as:  ADVIL/MOTRIN   This may have changed:  Another medication with the same name was added. Make sure you understand how and when to take each.   Used for:  Chronic intractable headache, unspecified headache type   Changed by:  Kaleb  BETTY Johnston CNP        Dose:  800 mg   Take 1 tablet (800 mg) by mouth every 6 hours as needed for moderate pain   Quantity:  60 tablet   Refills:  1       * ibuprofen 600 MG tablet   Commonly known as:  ADVIL/MOTRIN   This may have changed:  You were already taking a medication with the same name, and this prescription was added. Make sure you understand how and when to take each.   Used for:  Vaginal pain   Changed by:  Aviva Braun MD        Dose:  600 mg   Take 1 tablet (600 mg) by mouth every 6 hours as needed for moderate pain   Quantity:  60 tablet   Refills:  0       * Notice:  This list has 2 medication(s) that are the same as other medications prescribed for you. Read the directions carefully, and ask your doctor or other care provider to review them with you.      Stop taking these medicines if you haven't already. Please contact your care team if you have questions.     ARTIFICIAL TEAR OP   Stopped by:  Aviva Braun MD           carboxymethylcellulose 0.5 % Soln ophthalmic solution   Commonly known as:  REFRESH PLUS   Stopped by:  Aviva Braun MD           cetirizine 10 MG tablet   Commonly known as:  zyrTEC   Stopped by:  Aviva Braun MD                Where to get your medicines      These medications were sent to [facility] [place], [place] - [address] [place] [address]     Phone:  [phone]     diphenhydrAMINE-zinc acetate cream    fluconazole 150 MG tablet    ibuprofen 600 MG tablet                Primary Care Provider Office Phone # Fax #    Aminata Molina [phone]       [facility] [address]  [facility] [address]        Thank you!     Thank you for choosing [facility]  for your care. Our goal is always to provide you with excellent care. Hearing back from our patients is one way we can continue to improve our services. Please take a few minutes to  complete the written survey that you may receive in the mail after your visit with us. Thank you!             Your Updated Medication List - Protect others around you: Learn how to safely use, store and throw away your medicines at www.disposemymeds.org.          This list is accurate as of: 3/15/17  3:50 PM.  Always use your most recent med list.                   Brand Name Dispense Instructions for use    Adult Blood Pressure Cuff Lg Kit     1 kit    1 Device as needed       amLODIPine 5 MG tablet    NORVASC    30 tablet    Take 1 tablet (5 mg) by mouth daily       diphenhydrAMINE-zinc acetate cream    BENADRYL    28 g    Apply topically 3 times daily as needed for itching or irritation       fluconazole 150 MG tablet    DIFLUCAN    1 tablet    Take 1 tablet (150 mg) by mouth once for 1 dose       fluticasone 50 MCG/ACT spray    FLONASE    16 g    Spray 1-2 sprays into both nostrils daily       hydrochlorothiazide 25 MG tablet    HYDRODIURIL    30 tablet    Take 0.5 tablets (12.5 mg) by mouth daily       hydrocortisone 1 % ointment     30 g    Apply sparingly to affected area three times daily for 14 days.       * ibuprofen 800 MG tablet    ADVIL/MOTRIN    60 tablet    Take 1 tablet (800 mg) by mouth every 6 hours as needed for moderate pain       * ibuprofen 600 MG tablet    ADVIL/MOTRIN    60 tablet    Take 1 tablet (600 mg) by mouth every 6 hours as needed for moderate pain       multivitamin, therapeutic with minerals Tabs tablet     100 tablet    Take 1 tablet by mouth daily       riboflavin 400 MG Caps     30 capsule    Take 400 mg by mouth daily       vitamin D 2000 UNITS tablet     100 tablet    Take 1 tablet by mouth daily       * Notice:  This list has 2 medication(s) that are the same as other medications prescribed for you. Read the directions carefully, and ask your doctor or other care provider to review them with you.

## 2017-03-15 NOTE — PROGRESS NOTES
HPI:       Juanita Salgado is a 42 year old who presents for the following  Patient presents with:  Derm Problem: rash on back and abdomen.  Patient went to [place] last night but did not stay to see a doctor.      Rash/Lesion     Onset: 2 weeks ago    Description:   Location: all over her body, last 3 days in vagina  Color: red  Character: raised, red  Itching (Pruritis): Yes Details: very itchy, worsening  Pain?:Yes     Progression of Symptoms:  worsening    Accompanying Signs & Symptoms:  Fever: Yes Details: subjective fever  Body aches or joint pain:  Yes Details: generalized body aches  Sore throat symptoms:no  Recent cold symptoms: no   History:   Previous similar rash: no    Precipitating factors:   Exposure to similar rash: no  New exposures: Yes Details: new soap about a month ago, scented dove  Recent travel: no  New Medication: no    What makes it better?:  Nothing     Therapies Tried and outcome:  Hydrocortisone cream, Details: no improvement    A Ouner  was used for  this visit.      Problem, Medication and Allergy Lists were reviewed and are current.  Patient is an established patient of this clinic.         Review of Systems:   Review of Systems   Constitutional: Positive for fatigue and fever (subjective).   HENT: Negative for congestion, hearing loss, rhinorrhea and sore throat.    Respiratory: Negative for shortness of breath.    Cardiovascular: Negative for chest pain.   Gastrointestinal: Negative for abdominal pain, nausea and vomiting.   Genitourinary: Positive for vaginal pain. Negative for pelvic pain, vaginal bleeding and vaginal discharge.   Skin: Positive for rash.   Neurological: Positive for weakness (generalized weakness).   Hematological: Negative for adenopathy.             Physical Exam:     Patient Vitals for the past 24 hrs:   BP Temp Temp src Pulse SpO2 Weight   03/15/17 1309 133/90 98.5  F (36.9  C) Oral 84 96 % 228 lb 6.4 oz (103.6 kg)     Body mass index is  32.77 kg/(m^2).  Vitals were reviewed and were normal     Physical Exam   Constitutional: She appears well-developed and well-nourished. No distress.   HENT:   Head: Normocephalic and atraumatic.   Eyes: Conjunctivae are normal. Right eye exhibits no discharge. Left eye exhibits no discharge.   Neck: Neck supple. No thyromegaly present.   Cardiovascular: Normal rate, regular rhythm and normal heart sounds.    No murmur heard.  Pulmonary/Chest: Effort normal and breath sounds normal. She has no wheezes. She has no rales.   Genitourinary:         Lymphadenopathy:     She has no cervical adenopathy.   Skin:        Psychiatric: She has a normal mood and affect. Her behavior is normal.         Results:      Results from the last 24 hoursNo results found for this or any previous visit (from the past 24 hour(s)).  Assessment and Plan     Juanita was seen today for derm problem.    Diagnoses and all orders for this visit:    Vaginal pain  Most likely bartholin cyst of the right labia. Patient was able to be [facility] for incision and drainage with word catheter placement. She was prescribed ibuprofen 600mg Q6H PRN pain.   -     ibuprofen (ADVIL/MOTRIN) 600 MG tablet; Take 1 tablet (600 mg) by mouth every 6 hours as needed for moderate pain  -     [facility]    Rash and nonspecific skin eruption  Most likely folliculitis vs hidradenitis suppurativa vs allergic contact dermatitis. Discussed supportive care including soaking in warm sitz baths and applying warm packs at least 3-4 times a day for 15 minutes. Prescribed benadryl cream to help with itching.  Follow up in one week if no improvement in symptoms or sooner if worsening pain or increasing, spreading erythema.   -     diphenhydrAMINE-zinc acetate (BENADRYL) cream; Apply topically 3 times daily as needed for itching or irritation      Medications Discontinued During This Encounter   Medication Reason      amitriptyline (ELAVIL) 25 MG tablet Stopped by Patient     SUMAtriptan (IMITREX) 25 MG tablet Stopped by Patient     acetaminophen (TYLENOL) 500 MG tablet Stopped by Patient     ketotifen (ZADITOR) 0.025 % SOLN Stopped by Patient     carboxymethylcellulose (REFRESH PLUS) 0.5 % SOLN      cetirizine (ZYRTEC) 10 MG tablet      ARTIFICIAL TEAR OP      loratadine (CLARITIN) 10 MG tablet Stopped by Patient     atorvastatin (LIPITOR) 80 MG tablet Stopped by Patient     Options for treatment and follow-up care were reviewed with the patient. Juanita Salgado  engaged in the decision making process and verbalized understanding of the options discussed and agreed with the final plan.    Aviva Braun MD

## 2017-03-15 NOTE — LETTER
[address] [place] [address],  [address]  [facility] [address]  [phone]  Dept: [phone]      3/15/2017    Re: Juanita Salgado      TO WHOM IT MAY CONCERN:    Juanita Salgado  was seen on March 15, 2017.  Please excuse her  until March 16,2017 due to minor procedure.    Cordrodrigue Ochoa MD  [facility]    FANNY Winkler

## 2017-03-15 NOTE — MR AVS SNAPSHOT
After Visit Summary   3/15/2017    Juanita Salgado    MRN: [id number]           Patient Information     Date Of Birth          1975        Visit Information        Provider Department      3/15/2017 1:00 PM Aviva Braun MD [facility]        Today's Diagnoses     Vaginal pain    -  1    Rash and nonspecific skin eruption          Care Instructions    Here is the plan from today's visit    1. Vaginal pain  - ibuprofen (ADVIL/MOTRIN) 600 MG tablet; Take 1 tablet (600 mg) by mouth every 6 hours as needed for moderate pain  Dispense: 60 tablet; Refill: 0  - [facility] INTERNAL REFERRAL    2. Rash and nonspecific skin eruption  - diphenhydrAMINE-zinc acetate (BENADRYL) cream; Apply topically 3 times daily as needed for itching or irritation  Dispense: 28 g; Refill: 0  -Apply warm packs at least 3-4 times a day for 15 minutes, warm sitz baths     Please call or return to clinic if your symptoms don't go away.    Follow up plan  Follow up if you are not improving in the next 1 week.    Thank you for coming to [facility] today.  Lab Testing:  **If you had lab testing today and your results are reassuring or normal they will be mailed to you or sent through [employer] within 7 days.   **If the lab tests need quick action we will call you with the results.  The phone number we will call with results is # [phone] (home) . If this is not the best number please call our clinic and change the number.  Medication Refills:  If you need any refills please call your pharmacy and they will contact us.   If you need to  your refill at a new pharmacy, please contact the new pharmacy directly. The new pharmacy will help you get your medications transferred faster.   Scheduling:  If you have any concerns about today's visit or wish to schedule another appointment please call our office during normal business hours [phone] (8-5:00 M-F)  If a referral was made to a  [facility] and you don't get a call from central scheduling please call [phone].  If a Mammogram was ordered for you at [facility] call [phone] to schedule or change your appointment.  If you had an XRay/CT/Ultrasound/MRI ordered the number is [phone] to schedule or change your radiology appointment.   Medical Concerns:  If you have urgent medical concerns please call [phone] at any time of the day.          Follow-ups after your visit        Additional Services     [facility] REF[facility]       What procedure, if known: Word catheter placement for bartholin cyst of R labia  Diagnosis: Bartholin cyst  Urgency of Appointment: Next Available  Would this patient benefit from pre-medication with Ativan for procedural anxiety? Yes (Procedure Provider will Order, Patient Instructed to Arrive 1 hour early)  Is this patient on a blood thinner? No  If this referral is for a circumcision, has the patient had a Vitamin K shot? N/A                  Your next 10 appointments already scheduled     Mar 15, 2017  3:00 PM CDT   Procedure with Mark Ochoa MD   [facility] ([facility])    [address] [place],  [address]  [facility] [address]   [phone]            Mar 29, 2017  1:15 PM CDT   New Patient Visit with Rosa Day MD   [facility] ([facility])    [place] [address] [facility] [address]  [address]  [facility] [address]   [phone]            Mar 29, 2017  4:00 PM CDT   Return Visit with Lake Fernando MD   [facility] ([facility])    [address] [place],  [address]  [facility] [address]   [phone]            Apr 06, 2017  1:15 PM CDT   RETURN GENERAL with Everett Anguiano MD   [facility] Clinic ([facility])    [address] [facility]  [address]th [address] Clin [address] [place] [id number]   [phone]               Who to contact     Please call your clinic at [phone] to:    Ask questions about your health    Make or cancel appointments    Discuss your medicines    Learn about your test results    Speak to your doctor   If you have compliments or concerns about an experience at your clinic, or if you wish to file a complaint, please contact [facility] Patient Relations at [phone] or email us at [email or web address]         Additional Information About Your Visit        [employer] Information     Mindframe is an electronic gateway that provides easy, online access to your medical records. With Mindframe, you can request a clinic appointment, read your test results, renew a prescription or communicate with your care team.     To sign up for Mindframe visit the website at [email or web address]   You will be asked to enter the access code listed below, as well as some personal information. Please follow the directions to create your username and password.     Your access code is: [email or web address]  Expires: 3/26/2017  7:30 AM     Your access code will  in 90 days. If you need help or a new code, please contact your [facility] Clinic or call [phone] for assistance.        Care EveryWhere ID     This is your Care EveryWhere ID. This could be used by other organizations to access your [place] medical records  [id number]        Your Vitals Were     Pulse Temperature Last Period Pulse Oximetry BMI (Body Mass Index)       84 98.5  F (36.9  C) (Oral) 2017 96% 32.77 kg/m2        Blood Pressure from Last 3 Encounters:   03/15/17 133/90   17 138/89   17 136/86    Weight from Last 3 Encounters:   03/15/17 228 lb 6.4 oz (103.6 kg)   17 227 lb 6.4 oz (103.1 kg)   17 226 lb 6.4 oz (102.7 kg)              We Performed the Following     [facility]          Today's Medication Changes          These  changes are accurate as of: 3/15/17  2:08 PM.  If you have any questions, ask your nurse or doctor.               Start taking these medicines.        Dose/Directions    diphenhydrAMINE-zinc acetate cream   Commonly known as:  BENADRYL   Used for:  Rash and nonspecific skin eruption   Started by:  Aviva Braun MD        Apply topically 3 times daily as needed for itching or irritation   Quantity:  28 g   Refills:  0         These medicines have changed or have updated prescriptions.        Dose/Directions    * ibuprofen 800 MG tablet   Commonly known as:  ADVIL/MOTRIN   This may have changed:  Another medication with the same name was added. Make sure you understand how and when to take each.   Used for:  Chronic intractable headache, unspecified headache type   Changed by:  Jailene Manuel APRN CNP        Dose:  800 mg   Take 1 tablet (800 mg) by mouth every 6 hours as needed for moderate pain   Quantity:  60 tablet   Refills:  1       * ibuprofen 600 MG tablet   Commonly known as:  ADVIL/MOTRIN   This may have changed:  You were already taking a medication with the same name, and this prescription was added. Make sure you understand how and when to take each.   Used for:  Vaginal pain   Changed by:  Aviva Braun MD        Dose:  600 mg   Take 1 tablet (600 mg) by mouth every 6 hours as needed for moderate pain   Quantity:  60 tablet   Refills:  0       * Notice:  This list has 2 medication(s) that are the same as other medications prescribed for you. Read the directions carefully, and ask your doctor or other care provider to review them with you.      Stop taking these medicines if you haven't already. Please contact your care team if you have questions.     ARTIFICIAL TEAR OP   Stopped by:  Aviva Braun MD           carboxymethylcellulose 0.5 % Soln ophthalmic solution   Commonly known as:  REFRESH PLUS   Stopped by:  Aviva Braun MD           cetirizine 10 MG tablet    Commonly known as:  zyrTEC   Stopped by:  Aviva Braun MD                Where to get your medicines      These medications were sent to [facility] [place], [place] - [address], [facility] [address]     Phone:  [phone]     diphenhydrAMINE-zinc acetate cream    ibuprofen 600 MG tablet                Primary Care Provider Office Phone # Fax #    Aminata Tamie Molina [phone]       [facility] [address]  [facility] [address]        Thank you!     Thank you for choosing [facility]  for your care. Our goal is always to provide you with excellent care. Hearing back from our patients is one way we can continue to improve our services. Please take a few minutes to complete the written survey that you may receive in the mail after your visit with us. Thank you!             Your Updated Medication List - Protect others around you: Learn how to safely use, store and throw away your medicines at www.disposemymeds.org.          This list is accurate as of: 3/15/17  2:08 PM.  Always use your most recent med list.                   Brand Name Dispense Instructions for use    Adult Blood Pressure Cuff Lg Kit     1 kit    1 Device as needed       amLODIPine 5 MG tablet    NORVASC    30 tablet    Take 1 tablet (5 mg) by mouth daily       diphenhydrAMINE-zinc acetate cream    BENADRYL    28 g    Apply topically 3 times daily as needed for itching or irritation       fluticasone 50 MCG/ACT spray    FLONASE    16 g    Spray 1-2 sprays into both nostrils daily       hydrochlorothiazide 25 MG tablet    HYDRODIURIL    30 tablet    Take 0.5 tablets (12.5 mg) by mouth daily       hydrocortisone 1 % ointment     30 g    Apply sparingly to affected area three times daily for 14 days.       * ibuprofen 800 MG tablet    ADVIL/MOTRIN    60 tablet    Take 1 tablet (800 mg) by mouth every 6 hours as needed for moderate pain       *  ibuprofen 600 MG tablet    ADVIL/MOTRIN    60 tablet    Take 1 tablet (600 mg) by mouth every 6 hours as needed for moderate pain       multivitamin, therapeutic with minerals Tabs tablet     100 tablet    Take 1 tablet by mouth daily       riboflavin 400 MG Caps     30 capsule    Take 400 mg by mouth daily       vitamin D 2000 UNITS tablet     100 tablet    Take 1 tablet by mouth daily       * Notice:  This list has 2 medication(s) that are the same as other medications prescribed for you. Read the directions carefully, and ask your doctor or other care provider to review them with you.

## 2017-03-17 ENCOUNTER — TRANSFERRED RECORDS (OUTPATIENT)
Dept: HEALTH INFORMATION MANAGEMENT | Facility: CLINIC | Age: 42
End: 2017-03-17

## 2017-03-22 PROBLEM — I67.1 CEREBRAL ANEURYSM, NONRUPTURED: Status: ACTIVE | Noted: 2017-03-22

## 2017-03-24 ENCOUNTER — TELEPHONE (OUTPATIENT)
Dept: FAMILY MEDICINE | Facility: CLINIC | Age: 42
End: 2017-03-24

## 2017-03-24 NOTE — TELEPHONE ENCOUNTER
Please call patient to schedule hospital follow up visit.    Admitted to [facility] for syncope, discharged 3/21/17.    Pau Jimenez RN

## 2017-03-27 ENCOUNTER — OFFICE VISIT (OUTPATIENT)
Dept: FAMILY MEDICINE | Facility: CLINIC | Age: 42
End: 2017-03-27

## 2017-03-27 VITALS
DIASTOLIC BLOOD PRESSURE: 82 MMHG | TEMPERATURE: 98.6 F | HEART RATE: 106 BPM | SYSTOLIC BLOOD PRESSURE: 132 MMHG | WEIGHT: 218.4 LBS | OXYGEN SATURATION: 97 % | RESPIRATION RATE: 20 BRPM | BODY MASS INDEX: 31.34 KG/M2

## 2017-03-27 DIAGNOSIS — N75.0 BARTHOLIN GLAND CYST: ICD-10-CM

## 2017-03-27 DIAGNOSIS — J30.2 SEASONAL ALLERGIC RHINITIS, UNSPECIFIED ALLERGIC RHINITIS TRIGGER: ICD-10-CM

## 2017-03-27 DIAGNOSIS — I67.1 CEREBRAL ANEURYSM, NONRUPTURED: Primary | ICD-10-CM

## 2017-03-27 DIAGNOSIS — Z00.00 HEALTHCARE MAINTENANCE: ICD-10-CM

## 2017-03-27 DIAGNOSIS — E78.5 HYPERLIPIDEMIA, UNSPECIFIED HYPERLIPIDEMIA TYPE: ICD-10-CM

## 2017-03-27 DIAGNOSIS — R73.03 PREDIABETES: ICD-10-CM

## 2017-03-27 DIAGNOSIS — I10 BENIGN ESSENTIAL HYPERTENSION: ICD-10-CM

## 2017-03-27 DIAGNOSIS — L02.92 FURUNCLE: ICD-10-CM

## 2017-03-27 RX ORDER — FLUTICASONE PROPIONATE 50 MCG
1-2 SPRAY, SUSPENSION (ML) NASAL DAILY
Qty: 16 G | Refills: 6 | Status: SHIPPED | OUTPATIENT
Start: 2017-03-27 | End: 2017-03-29

## 2017-03-27 RX ORDER — CHOLECALCIFEROL (VITAMIN D3) 50 MCG
1 TABLET ORAL DAILY
Qty: 100 TABLET | Refills: 3 | Status: SHIPPED | OUTPATIENT
Start: 2017-03-27 | End: 2017-08-09

## 2017-03-27 RX ORDER — ASPIRIN 81 MG/1
81 TABLET, CHEWABLE ORAL
COMMUNITY
Start: 2017-03-21 | End: 2017-05-23

## 2017-03-27 RX ORDER — CEPHALEXIN 500 MG/1
500 CAPSULE ORAL
COMMUNITY
Start: 2017-03-21 | End: 2017-04-04

## 2017-03-27 RX ORDER — HYDROCHLOROTHIAZIDE 25 MG/1
TABLET ORAL
COMMUNITY
End: 2017-04-12

## 2017-03-27 NOTE — PROGRESS NOTES
Preceptor Attestation:   Patient seen and discussed with the resident.   Assessment and plan reviewed with resident and agreed upon.   Supervising Physician:  Mark Ochoa MD  [facility]

## 2017-03-27 NOTE — MR AVS SNAPSHOT
After Visit Summary   3/27/2017    Juanita Salgado    MRN: [id number]           Patient Information     Date Of Birth          1975        Visit Information        Provider Department      3/27/2017 2:00 PM Genna Phelps MD [facility]        Today's Diagnoses     Cerebral aneurysm, nonruptured    -  1    Healthcare maintenance        Seasonal allergic rhinitis, unspecified allergic rhinitis trigger        Bartholin gland cyst        Benign essential hypertension        Furuncle          Care Instructions    Here is the plan from today's visit    1. Cerebral aneurysm, nonruptured  -Continue Aspirin 81 mg  -Continue  Hydrochlorothiazide 12.5 mg   -Goal is to keep BP <140/90  -Will calculate ASCVD risk and may or may not start on a statin     2. Healthcare maintenance  - Cholecalciferol (VITAMIN D) 2000 UNITS tablet; Take 1 tablet by mouth daily  Dispense: 100 tablet; Refill: 3    3. Seasonal allergic rhinitis, unspecified allergic rhinitis trigge  - fluticasone (FLONASE) 50 MCG/ACT spray; Spray 1-2 sprays into both nostrils daily  Dispense: 16 g; Refill: 6    4. Bartholin gland cyst  - follow up if cyst enlargement rescurs     5. Benign essential hypertension  Cont HCTZ , BP goal < 140/90     6. Furuncle  -Complete Cephalexin   -Eat yogurt     Please call or return to clinic if your symptoms don't go away.    Follow up plan  Please make a clinic appointment for follow up with me (GENNA PHELPS) in 2  Weeks for HTN evaluation .    Thank you for coming to [facility] today.  Lab Testing:  **If you had lab testing today and your results are reassuring or normal they will be mailed to you or sent through i-marker within 7 days.   **If the lab tests need quick action we will call you with the results.  The phone number we will call with results is # [phone] (home) . If this is not the best number please call our clinic and change the number.  Medication Refills:  If you need any  refills please call your pharmacy and they will contact us.   If you need to  your refill at a new pharmacy, please contact the new pharmacy directly. The new pharmacy will help you get your medications transferred faster.   Scheduling:  If you have any concerns about today's visit or wish to schedule another appointment please call our office during normal business hours [phone] (8-5:00 M-F)  If a referral was made to a [facility] and you don't get a call from central scheduling please call [phone].  If a Mammogram was ordered for you at [facility] call [phone] to schedule or change your appointment.  If you had an XRay/CT/Ultrasound/MRI ordered the number is [phone] to schedule or change your radiology appointment.   Medical Concerns:  If you have urgent medical concerns please call [phone] at any time of the day.        Follow-ups after your visit        Follow-up notes from your care team     Return in about 2 weeks (around 4/10/2017).      Your next 10 appointments already scheduled     Mar 29, 2017  1:15 PM CDT   New Patient Visit with Rosa Day MD   [facility] ([facility])    [place] [facility] [address] [address]  [address]  [address]  [facility] [id number] [phone]            Mar 29, 2017  4:00 PM CDT   Return Visit with Lake Fernando MD   [facility] ([facility])    [address],  [address]  [facility] [address]   [phone]            Apr 06, 2017  1:15 PM CDT   RETURN GENERAL with Everett Anguiano MD   Eye Clinic ([facility])    [address]  [address] [facility]  [address] [facility] [address]  [facility] [id number] [phone]              Who to contact     Please call your clinic at [phone] to:    Ask questions about your health    Make or cancel appointments    Discuss your medicines    Learn about your test  results    Speak to your doctor   If you have compliments or concerns about an experience at your clinic, or if you wish to file a complaint, please contact [facility] Patient Relations at [phone] or email us at [email or web address]         Additional Information About Your Visit        Oh[employer]al[employer]art Information     Powa Techn[employer] is an electronic gateway that provides easy, online access to your medical records. With [email or web address], you can request a clinic appointment, read your test results, renew a prescription or communicate with your care team.     To sign up for [email or web address] visit the website at [email or web address]   You will be asked to enter the access code listed below, as well as some personal information. Please follow the directions to create your username and password.     Your access code is: [email or web address]  Expires: 2017  2:40 PM     Your access code will  in 90 days. If you need help or a new code, please contact your [facility] or call [phone] for assistance.        Care EveryWhere ID     This is your Care EveryWhere ID. This could be used by other organizations to access your [place] medical records  [id number]        Your Vitals Were     Pulse Temperature Respirations Last Period Pulse Oximetry BMI (Body Mass Index)    106 98.6  F (37  C) (Oral) 20 2017 97% 31.34 kg/m2       Blood Pressure from Last 3 Encounters:   17 132/82   03/15/17 133/90   03/15/17 133/90    Weight from Last 3 Encounters:   17 218 lb 6.4 oz (99.1 kg)   03/15/17 228 lb (103.4 kg)   03/15/17 228 lb 6.4 oz (103.6 kg)              Today, you had the following     No orders found for display         Where to get your medicines      These medications were sent to [facility] [place], [place] [address] [facility] [address]     Phone:  [phone]     fluticasone 50 MCG/ACT spray    vitamin D 2000 UNITS  tablet          Primary Care Provider Office Phone # Fax #    Aminata Tamie Molina [phone]       [facility] [address]  [facility] [address]        Thank you!     Thank you for choosing [facility]  for your care. Our goal is always to provide you with excellent care. Hearing back from our patients is one way we can continue to improve our services. Please take a few minutes to complete the written survey that you may receive in the mail after your visit with us. Thank you!             Your Updated Medication List - Protect others around you: Learn how to safely use, store and throw away your medicines at www.disposemymeds.org.          This list is accurate as of: 3/27/17  2:40 PM.  Always use your most recent med list.                   Brand Name Dispense Instructions for use    Adult Blood Pressure Cuff Lg Kit     1 kit    1 Device as needed       amLODIPine 5 MG tablet    NORVASC    30 tablet    Take 1 tablet (5 mg) by mouth daily       aspirin 81 MG chewable tablet      Take 81 mg by mouth       cephALEXin 500 MG capsule    KEFLEX     Take 500 mg by mouth       diphenhydrAMINE-zinc acetate cream    BENADRYL    28 g    Apply topically 3 times daily as needed for itching or irritation       fluticasone 50 MCG/ACT spray    FLONASE    16 g    Spray 1-2 sprays into both nostrils daily       * hydrochlorothiazide 25 MG tablet    HYDRODIURIL         * hydrochlorothiazide 25 MG tablet    HYDRODIURIL    30 tablet    Take 0.5 tablets (12.5 mg) by mouth daily       hydrocortisone 1 % ointment     30 g    Apply sparingly to affected area three times daily for 14 days.       * ibuprofen 800 MG tablet    ADVIL/MOTRIN    60 tablet    Take 1 tablet (800 mg) by mouth every 6 hours as needed for moderate pain       * ibuprofen 600 MG tablet    ADVIL/MOTRIN    60 tablet    Take 1 tablet (600 mg) by mouth every 6 hours as needed for moderate pain       multivitamin,  therapeutic with minerals Tabs tablet     100 tablet    Take 1 tablet by mouth daily       riboflavin 400 MG Caps     30 capsule    Take 400 mg by mouth daily       vitamin D 2000 UNITS tablet     100 tablet    Take 1 tablet by mouth daily       * Notice:  This list has 4 medication(s) that are the same as other medications prescribed for you. Read the directions carefully, and ask your doctor or other care provider to review them with you.

## 2017-03-27 NOTE — PATIENT INSTRUCTIONS
Here is the plan from today's visit  padmini rangel     Please call or return to clinic if your symptoms don't go away.    Follow up plan  Please make a clinic appointment for follow up with me (GARY PIEDRA) in 2  Weeks for HTN evaluation .    Thank you for coming to [facility] today.  Lab Testing:  **If you had lab testing today and your results are reassuring or normal they will be mailed to you or sent through [facility] within 7 days.   **If the lab tests need quick action we will call you with the results.  The phone number we will call with results is # [phone] (home) . If this is not the best number please call our clinic and change the number.  Medication Refills:  If you need any refills please call your pharmacy and they will contact us.   If you need to  your refill at a new pharmacy, please contact the new pharmacy directly. The new pharmacy will help you get your medications transferred faster.   Scheduling:  If you have any concerns about today's visit or wish to schedule another appointment please call our office during normal business hours [phone] (8-5:00 M-F)  If a referral was made to a [facility] and you don't get a call from central scheduling please call [phone].  If a Mammogram was ordered for you at [facility] call [phone] to schedule or change your appointment.  If you had an XRay/CT/Ultrasound/MRI ordered the number is [phone] to schedule or change your radiology appointment.   Medical Concerns:  If you have urgent medical concerns please call [phone] at any time of the day.

## 2017-03-27 NOTE — PROGRESS NOTES
IVETH Salgado is a 42 year old  Female who is here for a hospital follow up . Patient was admitted at [facility] .    She reports after an argument at home , passed out . Unknown duration. No incontinence or abnormal movement was noted. When she woke up she has some generalized headache which she initially thought was due to migraine . No amnesia, head trauma, vomiting, focal numbness, weakness , chest pain or shortness of breath. She was brought to the ED because of this and was had an extensive syncope and rule out stroke  work up.  She had normal CBC and metabolic panel. Normal EKG but was found to have 2.5 mm MCA aneurysm but negative for any other acute pathology. She was started on an antihypertensive medication as well due to slight BP elevation . She was also treated with Keflex for several small furuncles on her left anterior thigh . Her word's catheter fell . Currently she is feeling better. She denies any fever, headache , dizziness,chest pain, , dyspnea or abdominal pain. Her small skin abscess if resolved. She denies any perineal pain .       A Haloband  was used for  this visit.      Patient Active Problem List    Diagnosis Date Noted     Cough 10/05/2015     Priority: High     Class: Acute     Prediabetes 04/21/2016     Priority: Medium     Cerebral aneurysm, nonruptured 03/22/2017     Asymptomatic, 2.5mm, low risk of rupture. Incidental small right anterior temporal MCA aneurysm from on CT angio of head 3/20/2017. Recommend follow up MR angiogram in 1 year (March 2018). Neuro Interventional Radiology Inpatient Consult 3/17/2017, recommend daily aspirin.       Hyperlipidemia 02/16/2016     ASCVD  Risk Calculator (pooled cohort)   10 year ASCVD Risk = 5.3%  (Calculated 3/21/2017)    BP Readings from Last 3 Encounters:   02/15/16 128/82   12/09/15 141/74   12/09/15 149/91     CHOL    230.1   2/15/2016  HDL     41.9   2/15/2016  LDL      113   2/15/2016  TRIG    376.3    2/15/2016  CHOLHDLRATIO      5.5   2/15/2016    Ethnic Group.[place]         Hypertension, benign essential, goal below 140/90 07/07/2015     Has small anterior temporal MCA aneurysm, 2.5mm, found on CT angio of head 3/20/17         Current Outpatient Prescriptions   Medication Sig Dispense Refill     aspirin 81 MG chewable tablet Take 81 mg by mouth       cephALEXin (KEFLEX) 500 MG capsule Take 500 mg by mouth       ibuprofen (ADVIL/MOTRIN) 600 MG tablet Take 1 tablet (600 mg) by mouth every 6 hours as needed for moderate pain 60 tablet 0     hydrochlorothiazide (HYDRODIURIL) 25 MG tablet Take 0.5 tablets (12.5 mg) by mouth daily 30 tablet 6     hydrochlorothiazide (HYDRODIURIL) 25 MG tablet        diphenhydrAMINE-zinc acetate (BENADRYL) cream Apply topically 3 times daily as needed for itching or irritation 28 g 0     hydrocortisone 1 % ointment Apply sparingly to affected area three times daily for 14 days. 30 g 0     fluticasone (FLONASE) 50 MCG/ACT spray Spray 1-2 sprays into both nostrils daily 16 g 6     riboflavin 400 MG CAPS Take 400 mg by mouth daily 30 capsule 11     Cholecalciferol (VITAMIN D) 2000 UNITS tablet Take 1 tablet by mouth daily 100 tablet 3     multivitamin, therapeutic with minerals (MULTI-VITAMIN) TABS Take 1 tablet by mouth daily 100 tablet 3     amLODIPine (NORVASC) 5 MG tablet Take 1 tablet (5 mg) by mouth daily 30 tablet 6     ibuprofen (ADVIL,MOTRIN) 800 MG tablet Take 1 tablet (800 mg) by mouth every 6 hours as needed for moderate pain 60 tablet 1     Blood Pressure Monitoring (ADULT BLOOD PRESSURE CUFF LG) KIT 1 Device as needed 1 kit 0          Allergies   Allergen Reactions     Seasonal Allergies        Problem, Medication and Allergy Lists were reviewed and are current.  reviewed and updated if needed..    Patient is an established patient of this clinic..         Review of Systems:   General: No distress  HEENT: No sore throat  Pulm: No shortness of breath, no cough  CVS: No chest  pain, no palpitations  GI: No abdominal pain, nausea, vomiting or diarrhea   : No dysuria  MSK: No back pain  Skin: No new rashes  Neuro: No headache, no dizziness,   Psych: no depression              Physical Exam:      /82  Pulse 106  Temp 98.6  F (37  C) (Oral)  Resp 20  Wt 218 lb 6.4 oz (99.1 kg)  LMP 02/02/2017  SpO2 97%  BMI 31.34 kg/m2    Constitutional: Awake, alert, cooperative, no apparent distress, and appears stated age  Pulm: CTAB, No rales, No wheeze, no increased effort of breathing.  CVS: RRR, No murmurs   GI: S NT ND BS +ve  MSK: No pedal edema or calf tenderness.  :  Normal female external genitalia, + bleeding due to menses, negative labial mass, swelling or tenderness to palpation   Skin : 3 circular hyperpigmented flat nodule on her left anterior thigh, non tender, no creeping erythema or induration   Neuro:  No focal neurological deficit  Psych : Good eye contact, well groomed, very interactive and collaborative. Good insight. Thought process is linear and coherent. Associations are tight. Mood is good. Affect euthymic.     No results found for this or any previous visit (from the past 24 hour(s)).  Office Visit on 02/16/2017   Component Date Value Ref Range Status     Rapid Strep A Screen 02/16/2017 POSITIVE  Negative Final        ECHO 3/21/17   Final Impressions:    1. Normal LV size, borderline wall thickness, normal global systolic function with an estimated EF of 60 - 65%.     CT Angio 3/21/17   Findings:    A 2.5 mm aneurysm is present at the origin of the anterior temporal artery branch of the right middle cerebral artery.  No other aneurysm is seen. There is otherwise normal filling of the intracranial vasculature.  There is no evidence for a vascular malformation.  The dural venous sinuses are patent.  The underlying brain parenchyma is unremarkable at CTA.        Impression:    2.5 mm right anterior temporal MCA aneurysm.    3/19/17   Chest CT PE Protocol      IMPRESSION:    1.  No pulmonary embolism, aortic dissection, or pneumonia.     2. Cholelithiasis    EKG :   3/18/17   Normal sinus rhythm  Moderate voltage criteria for LVH, may be normal variant  Nonspecific T wave abnormality  Abnormal ECG  When compared with ECG of 18-MAR-2017 00:03,  Nonspecific T wave abnormality, improved in Inferior leads  T wave inversion no longer evident in Anterior leads    MR MRA Head and Neck  Comparison:    CT scan of the head 03/18/2017     3/19/17   Creatinine 0.71   GFR >60     Lipid 10/4/16  Tot Cholesterol 214  HDL 36           Findings:    No acute ischemia, edema, mass effect, midline shift, or hydrocephalus is     demonstrated. Brain parenchymal volume appears within normal limits for     age. Multiple subcortical subcentimeter T2/FLAIR signal hyperintensities     are present primarily within the left frontal lobe, a nonspecific finding.     No parenchymal hemosiderin deposition is demonstrated. There is no     suspicious intracranial enhancement. The orbital contents are grossly     unremarkable. There is no obstructive paranasal sinus mucosal thickening.     The mastoid air cells trace fluid signal on the right. Previously     described skullbase lucency seen in the region of the sphenoid is most     consistent with sphenoid benign fatty lesion.         Impression :    1. No acute intracranial abnormality. No acute ischemia.     2. Multiple subcortical subcentimeter T2/FLAIR signal hyperintensities are     present primarily within the left frontal lobe, a nonspecific finding     which can be seen in the setting of a prior inflammatory process as well     as chronic headaches.     3. No parenchymal hemosiderin deposition.     4. No suspicious intracranial enhancement.     5. Previously described skullbase lucency in the region of the sphenoid is     most consistent with sphenoid benign fatty lesion.         Technique :    MR Angiography of the head was performed  without intravenous contrast     using time of flight technique. 3D reformatted images were also acquired.         Findings :    Anterior circulation: Distal internal carotid arteries appear patent. The     anterior cerebral arteries appear patent proximally. The middle cerebral     arteries appear patent proximally. Mild prominence at the right MCA     bifurcation which primarily involves the origin of the inferior division     is demonstrated.  A 2-3 millimeter focal outpouching may represent a small     aneurysm in this location.     Posterior circulation: The distal vertebral arteries are patent.  The     basilar artery is patent.  The posterior cerebral arteries arise from the     basilar tip and appear patent.         Impression :    1. No flow-limiting stenosis is present within the major intracranial     arterial vasculature.     2. Mild prominence at the right MCA bifurcation which appears to involve     the origin of the inferior division is demonstrated. A 2-3 millimeter     focal outpouching may represent a small aneurysm in this location.         Technique :    MR Angiography of the neck was performed with and without intravenous     contrast using time of flight technique. 3D reformatted images were also     acquired..         Findings:    Artifact somewhat limits evaluation. The great vessel origins appear     patent. Common carotid arteries appear patent.  Carotid bifurcations are     patent. The cervical internal carotid arteries are patent.  The cervical     vertebral arteries are patent.         Impression :    Artifact somewhat limits evaluation. No flow-limiting stenosis is present     in the major arterial vasculature of the neck.    Assessment and Plan      Juanita was seen today for the followin. Cerebral aneurysm, nonruptured: -Recent imaging found 2.5 mm. Asymptomatic , BP at goal < 132/80. Normal neurologic examination.   -Continue Aspirin 81 mg  -Continue  Hydrochlorothiazide 12.5 mg    -Goal is to keep BP <140/90  -Will calculate ASCVD risk and may or may not start on a statin     2. Healthcare maintenance  - [id number] (VITAMIN D) 2000 UNITS tablet; Take 1 tablet by mouth daily  Dispense: 100 tablet; Refill: 3    3. Seasonal allergic rhinitis, unspecified allergic rhinitis trigge  - fluticasone (FLONASE) 50 MCG/ACT spray; Spray 1-2 sprays into both nostrils daily  Dispense: 16 g; Refill: 6    4. Bartholin gland cyst, resolved   - follow up if cyst enlargement rescurs     5. Benign essential hypertension  Cont HCTZ , BP goal < 140/90     6. Furuncle  -Complete Cephalexin   -Eat Yogurt     7. Hyperlipidemia, unspecified hyperlipidemia type  Last HDL 36, , tot cholesterol 214 (10/14/16)   -ASCVD Risk 5.3%, no indication for statin at the moment    8. Prediabetes  Last A1c 5.9 3/19/17     - To follow up in 2 weeks      There are no discontinued medications.    Options for treatment and follow-up care were reviewed with the patient. Juanita Salgado  engaged in the decision making process and verbalized understanding of the options discussed and agreed with the final plan.    Genna Phelps MD G3  [facility]  Family Medicine Resident  Pager [phone]

## 2017-03-29 ENCOUNTER — OFFICE VISIT (OUTPATIENT)
Dept: OBGYN | Facility: CLINIC | Age: 42
End: 2017-03-29
Attending: OBSTETRICS & GYNECOLOGY
Payer: COMMERCIAL

## 2017-03-29 VITALS
SYSTOLIC BLOOD PRESSURE: 137 MMHG | WEIGHT: 219.1 LBS | DIASTOLIC BLOOD PRESSURE: 88 MMHG | HEART RATE: 79 BPM | HEIGHT: 70 IN | BODY MASS INDEX: 31.37 KG/M2

## 2017-03-29 DIAGNOSIS — N94.6 DYSMENORRHEA: Primary | ICD-10-CM

## 2017-03-29 PROCEDURE — 76830 TRANSVAGINAL US NON-OB: CPT | Mod: ZF | Performed by: OBSTETRICS & GYNECOLOGY

## 2017-03-29 PROCEDURE — 99212 OFFICE O/P EST SF 10 MIN: CPT | Mod: ZF

## 2017-03-29 RX ORDER — IBUPROFEN 600 MG/1
600 TABLET, FILM COATED ORAL EVERY 6 HOURS PRN
Qty: 60 TABLET | Refills: 1 | Status: SHIPPED | OUTPATIENT
Start: 2017-03-29 | End: 2017-08-09

## 2017-03-29 NOTE — MR AVS SNAPSHOT
After Visit Summary   3/29/2017    Juanita Salgado    MRN: [id number]           Patient Information     Date Of Birth          1975        Visit Information        Provider Department      3/29/2017 1:15 PM Rosa Day MD [facility]        Today's Diagnoses     Dysmenorrhea    -  1       Follow-ups after your visit        Follow-up notes from your care team     Return in about 2 weeks (around 4/12/2017).      Your next 10 appointments already scheduled     Apr 06, 2017  1:15 PM CDT   RETURN GENERAL with Everett Anguiano MD   [facility] ([facility])    [address]  [address]  [address]  [place] [facility] [address] [phone]            Apr 11, 2017  1:00 PM CDT   Return Visit with Genna Phelps MD   [facility] ([facility])    [facility] [address] [place] [address],  [address]  [facility] [facility] [address]   [phone]            Apr 12, 2017  2:00 PM CDT   ULTRASOUND with [facility] ULTRASOUND   [facility] ([facility])    [facility] [address]  [address]  [facility] [id number] [phone]            Apr 12, 2017  2:45 PM CDT   Return Visit with Catia Vail MD   [facility] ([facility])    [facility] [address]  [address]  [facility] [address] [phone]              Who to contact     Please call your clinic at [phone] to:    Ask questions about your health    Make or cancel appointments    Discuss your medicines    Learn about your test results    Speak to your doctor   If you have compliments or concerns about an experience at your clinic, or if you wish to file a complaint, please contact [facility] Patient Relations at [phone] or email us at [email or web address]         Additional Information About Your Visit        MyChart Information  "    [employer]ut is an electronic gateway that provides easy, online access to your medical records. With [employer], you can request a clinic appointment, read your test results, renew a prescription or communicate with your care team.     To sign up for [employer]ut visit the website at [email or web address]   You will be asked to enter the access code listed below, as well as some personal information. Please follow the directions to create your username and password.     Your access code is: [email or web address]  Expires: 2017  2:40 PM     Your access code will  in 90 days. If you need help or a new code, please contact your [facility] or call [phone] for assistance.        Care EveryWhere ID     This is your Care EveryWhere ID. This could be used by other organizations to access your [place] medical records  [id number]        Your Vitals Were     Pulse Height Last Period Breastfeeding? BMI (Body Mass Index)       79 1.778 m (5' 10\") 2017 No 31.44 kg/m2        Blood Pressure from Last 3 Encounters:   17 137/88   17 132/82   03/15/17 133/90    Weight from Last 3 Encounters:   17 99.4 kg (219 lb 1.6 oz)   17 99.1 kg (218 lb 6.4 oz)   03/15/17 103.4 kg (228 lb)              We Performed the Following     US GYN Complete Transvaginal - 66195 (In Clinic)          Today's Medication Changes          These changes are accurate as of: 3/29/17  1:57 PM.  If you have any questions, ask your nurse or doctor.               Start taking these medicines.        Dose/Directions    ibuprofen 600 MG tablet   Commonly known as:  ADVIL/MOTRIN   Used for:  Dysmenorrhea   Started by:  Rosa Day MD        Dose:  600 mg   Take 1 tablet (600 mg) by mouth every 6 hours as needed for moderate pain   Quantity:  60 tablet   Refills:  1            Where to get your medicines      These medications were sent to [facility] - [place], [place] - " [address] AT [facility]  [address] [address]     Phone:  [phone]     ibuprofen 600 MG tablet                Primary Care Provider Office Phone # Fax #    Aminata Tamie Molina [phone]       [facility] [address] [facility] [address]        Thank you!     Thank you for choosing [facility]  for your care. Our goal is always to provide you with excellent care. Hearing back from our patients is one way we can continue to improve our services. Please take a few minutes to complete the written survey that you may receive in the mail after your visit with us. Thank you!             Your Updated Medication List - Protect others around you: Learn how to safely use, store and throw away your medicines at www.disposemymeds.org.          This list is accurate as of: 3/29/17  1:57 PM.  Always use your most recent med list.                   Brand Name Dispense Instructions for use    Adult Blood Pressure Cuff Lg Kit     1 kit    1 Device as needed       aspirin 81 MG chewable tablet      Take 81 mg by mouth       cephALEXin 500 MG capsule    KEFLEX     Take 500 mg by mouth       * hydrochlorothiazide 25 MG tablet    HYDRODIURIL         * hydrochlorothiazide 25 MG tablet    HYDRODIURIL    30 tablet    Take 0.5 tablets (12.5 mg) by mouth daily       ibuprofen 600 MG tablet    ADVIL/MOTRIN    60 tablet    Take 1 tablet (600 mg) by mouth every 6 hours as needed for moderate pain       vitamin D 2000 UNITS tablet     100 tablet    Take 1 tablet by mouth daily       * Notice:  This list has 2 medication(s) that are the same as other medications prescribed for you. Read the directions carefully, and ask your doctor or other care provider to review them with you.

## 2017-03-29 NOTE — LETTER
"3/29/2017       RE: Juanita Salgado  [address]  [facility] [address]     Dear Colleague,    Thank you for referring your patient, Juanita Salgado, to the [facility] at [facility]. Please see a copy of my visit note below.    [facility] GYN Clinic Visit    S: Juanita Salgado is a 38 year old (per patient report)  who presents for pelvic pain. She reports cramping pain prior to her period for the past three years, after her miscarriage. She says that she has a lot of painful cramping prior to her period which usually lasts about 3-4 days. Denies heavy bleeding with periods, only goes through 2-3 pads a day, although she did have small blood clots after her last menses. She also reports pain with intercourse with penetration. Denies vaginal discharge, irritation, burning with urination. She also desires pregnancy and is not using contraception. She has been taking tylenol for pain which helps a little bit.     OB/GYN Hx:  ,  x 5  SAB in   Left ovarian cyst in Dec 2015  Has regular monthly periods, every 28 days, not heavy  No history of abnormal pap smears  Last pap NILM, HPV neg 2015  No history of STIs  Not using contraception    PMH:  Hypertension  Hyperlipidemia    PSH:  None    Meds:  ASA 81 mg daily  Keflex (skin infection)  HCTZ 25 mg    Allergies:   None    Social Hx:  Lives with  and kids. Does not currently work outside. Denies tobacco, alcohol or drug use.    O:  /88 (BP Location: Left arm, Patient Position: Chair, Cuff Size: Adult Large)  Pulse 79  Ht 1.778 m (5' 10\")  Wt 99.4 kg (219 lb 1.6 oz)  LMP 2017  Breastfeeding? No  BMI 31.44 kg/m2    Constitutional: overweight female, no acute distress  HEENT: Normal appearance.  Neck supple, thyroid normal in size without nodularity or masses.  Cardiovascular: Regular rate and rhythm   Respiratory: nonlabored breathing  Gastrointestinal: Abdomen soft, non-tender. " BS normal. No masses, organomegaly  Pelvic Exam:  : External genitalia normal well-estrogenized, healthy tissue.  No obvious excoriations, lesions, or rashes. Bartholins, urethra, skeins normal.  Normal pink vaginal mucosa.  SSE: Normal cervix, normal physiologic discharge.   Bimanual: No CMT, mobile, small uterus. No adnexal masses or tenderness appreciated.   Psychiatric: mentation appears normal and affect normal/bright      A/P: Juanita Salgado is a 41 yo  who presents to clinic for dysmenorrhea and history of L complex ovarian cyst in .  - Pelvic US ordered to evaluate for structural abnormalities in uterus and adnexa.  - Recommend scheduled ibuprofen or naproxen the day before menses starts and through menstrual period. Rx sent for ibuprofen 600 mg q6h. Could also consider OCPs for dysmenorrhea although patient desires pregnancy at this time.  - Return to clinic after pelvic ultrasound to discuss results and follow up symptoms.     Patient staffed with Dr. Ghotra.    Sonia Day MD  OB/GYN Resident PGY3  Pager [phone]  [phone]    I agree with note as above.  Assessment and plan were jointly made.  Dilia Ghotra MD

## 2017-03-29 NOTE — NURSING NOTE
Chief Complaint   Patient presents with     Consult For     Pelvic pain       See DUDLEY Angela 3/29/2017

## 2017-03-29 NOTE — PROGRESS NOTES
"[facility] GYN Clinic Visit    S: Juanita Salgado is a 38 year old (per patient report)  who presents for pelvic pain. She reports cramping pain prior to her period for the past three years, after her miscarriage. She says that she has a lot of painful cramping prior to her period which usually lasts about 3-4 days. Denies heavy bleeding with periods, only goes through 2-3 pads a day, although she did have small blood clots after her last menses. She also reports pain with intercourse with penetration. Denies vaginal discharge, irritation, burning with urination. She also desires pregnancy and is not using contraception. She has been taking tylenol for pain which helps a little bit.     OB/GYN Hx:  ,  x 5  SAB in   Left ovarian cyst in Dec 2015  Has regular monthly periods, every 28 days, not heavy  No history of abnormal pap smears  Last pap NILM, HPV neg 2015  No history of STIs  Not using contraception    PMH:  Hypertension  Hyperlipidemia    PSH:  None    Meds:  ASA 81 mg daily  Keflex (skin infection)  HCTZ 25 mg    Allergies:   None    Social Hx:  Lives with  and kids. Does not currently work outside. Denies tobacco, alcohol or drug use.    O:  /88 (BP Location: Left arm, Patient Position: Chair, Cuff Size: Adult Large)  Pulse 79  Ht 1.778 m (5' 10\")  Wt 99.4 kg (219 lb 1.6 oz)  LMP 2017  Breastfeeding? No  BMI 31.44 kg/m2    Constitutional: overweight female, no acute distress  HEENT: Normal appearance.  Neck supple, thyroid normal in size without nodularity or masses.  Cardiovascular: Regular rate and rhythm   Respiratory: nonlabored breathing  Gastrointestinal: Abdomen soft, non-tender. BS normal. No masses, organomegaly  Pelvic Exam:  : External genitalia normal well-estrogenized, healthy tissue.  No obvious excoriations, lesions, or rashes. Bartholins, urethra, skeins normal.  Normal pink vaginal mucosa.  SSE: Normal cervix, normal physiologic discharge.   Bimanual: " No CMT, mobile, small uterus. No adnexal masses or tenderness appreciated.   Psychiatric: mentation appears normal and affect normal/bright      A/P: Juanita Salgado is a 43 yo  who presents to clinic for dysmenorrhea and history of L complex ovarian cyst in .  - Pelvic US ordered to evaluate for structural abnormalities in uterus and adnexa.  - Recommend scheduled ibuprofen or naproxen the day before menses starts and through menstrual period. Rx sent for ibuprofen 600 mg q6h. Could also consider OCPs for dysmenorrhea although patient desires pregnancy at this time.  - Return to clinic after pelvic ultrasound to discuss results and follow up symptoms.     Patient staffed with Dr. Ghotra.    Sonia Day MD  OB/GYN Resident PGY3  Pager [phone]  [phone]    I agree with note as above.  Assessment and plan were jointly made.  Dilia Ghotra MD

## 2017-04-02 PROBLEM — N75.0 BARTHOLIN GLAND CYST: Status: ACTIVE | Noted: 2017-04-02

## 2017-04-02 PROBLEM — E78.5 HYPERLIPIDEMIA, UNSPECIFIED HYPERLIPIDEMIA TYPE: Status: ACTIVE | Noted: 2017-04-02

## 2017-04-02 PROBLEM — L02.92 FURUNCLE: Status: ACTIVE | Noted: 2017-04-02

## 2017-04-06 ENCOUNTER — OFFICE VISIT (OUTPATIENT)
Dept: OPHTHALMOLOGY | Facility: CLINIC | Age: 42
End: 2017-04-06
Attending: OPHTHALMOLOGY
Payer: COMMERCIAL

## 2017-04-06 DIAGNOSIS — R73.03 PREDIABETES: ICD-10-CM

## 2017-04-06 DIAGNOSIS — H04.123 BILATERAL DRY EYES: Primary | ICD-10-CM

## 2017-04-06 PROCEDURE — 99213 OFFICE O/P EST LOW 20 MIN: CPT | Mod: ZF

## 2017-04-06 PROCEDURE — T1013 SIGN LANG/ORAL INTERPRETER: HCPCS | Mod: U3,ZF

## 2017-04-06 PROCEDURE — 92015 DETERMINE REFRACTIVE STATE: CPT | Mod: ZF

## 2017-04-06 ASSESSMENT — REFRACTION_WEARINGRX
OD_ADD: +2.25
OS_ADD: +2.25
OS_CYLINDER: +0.25
OS_SPHERE: +1.00
SPECS_TYPE: BIFOCAL
OD_CYLINDER: SPHERE
OD_SPHERE: +1.00
OS_AXIS: 140

## 2017-04-06 ASSESSMENT — VISUAL ACUITY
OS_SC: 20/70
OD_SC: J3
OS_SC: J3
METHOD: NUMBERS - LINEAR
OD_SC: 20/60

## 2017-04-06 ASSESSMENT — REFRACTION_MANIFEST
OD_AXIS: 180
OD_CYLINDER: +0.50
OD_SPHERE: +0.75
OD_ADD: +2.00
OS_SPHERE: +1.00
OS_ADD: +2.00
OS_CYLINDER: +0.50
OS_AXIS: 150

## 2017-04-06 ASSESSMENT — EXTERNAL EXAM - LEFT EYE: OS_EXAM: NORMAL

## 2017-04-06 ASSESSMENT — TONOMETRY
OS_IOP_MMHG: 13
OD_IOP_MMHG: 13
IOP_METHOD: ICARE

## 2017-04-06 ASSESSMENT — CONF VISUAL FIELD
OD_NORMAL: 1
OS_NORMAL: 1

## 2017-04-06 ASSESSMENT — CUP TO DISC RATIO
OS_RATIO: 0.5
OD_RATIO: 0.5

## 2017-04-06 ASSESSMENT — SLIT LAMP EXAM - LIDS
COMMENTS: MEIBOMIAN GLAND DYSFUNCTION
COMMENTS: MEIBOMIAN GLAND DYSFUNCTION

## 2017-04-06 ASSESSMENT — EXTERNAL EXAM - RIGHT EYE: OD_EXAM: NORMAL

## 2017-04-06 NOTE — NURSING NOTE
Chief Complaints and History of Present Illnesses   Patient presents with     Blurred Vision Both Eyes     Needs New glasses No Bi     HPI    Last Eye Exam:  3/31/16   Symptoms:     No floaters   No flashes         Do you have eye pain now?:  Yes   Location:  OU   Pain Level:  Mild Pain (3)   Unknown:  Duration unknown   Pain Characteristics:  Aching      Comments:  Tired eyes, hit head  Had an MRI Scan of head  Was told she had   An aneurism in March  Artificial Tears ARIANA Molina COT 1:39 PM April 6, 2017

## 2017-04-06 NOTE — LETTER
4/6/2017       RE: Juanita Salgado  [address]  [facility] [address]     Dear Colleague,    Thank you for referring your patient, Juanita Salgado, to the EYE CLINIC at [facility]. Please see a copy of my visit note below.    Assessment & Plan      Juanita Salgado is a 41 year old female with the following diagnoses:   1. Bilateral dry eyes    2. Prediabetes         No retinopathy  Stressed good glycemic and hypertensive control  Refractive options reviewed  Refraction given   Monitor yearly       Patient disposition:   Return in about 1 year (around 4/6/2018) for DFE.          Attending Physician Attestation: Complete documentation of historical and exam elements from today's encounter can be found in the full encounter summary report (not reduplicated in this progress note). I personally obtained the chief complaint(s) and history of present illness.  I confirmed and edited as necessary the review of systems, past medical/surgical history, family history, social history, and examination findings as documented by others; and I examined the patient myself. I personally reviewed the relevant tests, images, and reports as documented above. I formulated and edited as necessary the assessment and plan and discussed the findings and management plan with the patient and family. - Everett Anguiano MD 2:28 PM 4/6/2017     Again, thank you for allowing me to participate in the care of your patient.      Sincerely,    Everett Anguiano MD

## 2017-04-06 NOTE — MR AVS SNAPSHOT
After Visit Summary   4/6/2017    Juanita Salgado    MRN: [id number]           Patient Information     Date Of Birth          1975        Visit Information        Provider Department      4/6/2017 1:00 PM Rowdy Frey; Everett Anguiano MD [facility]        Today's Diagnoses     Bilateral dry eyes    -  1    Prediabetes           Follow-ups after your visit        Follow-up notes from your care team     Return in about 1 year (around 4/6/2018) for DFE.      Your next 10 appointments already scheduled     Apr 11, 2017  1:00 PM CDT   Return Visit with Genna Phelps MD   [facility]    [address],  [address]  [facility] [address]   [phone]            Apr 12, 2017  2:00 PM CDT   ULTRASOUND with [facility] ULTRASOUND   [facility] ([facility])    [facility] [address]  [address]  [facility] [address] [phone]            Apr 12, 2017  2:45 PM CDT   Return Visit with Catia Vail MD   [facility] ([facility])    [facility]  [address]  [facility] [address] [phone]              Who to contact     Please call your clinic at [phone] to:    Ask questions about your health    Make or cancel appointments    Discuss your medicines    Learn about your test results    Speak to your doctor   If you have compliments or concerns about an experience at your clinic, or if you wish to file a complaint, please contact [facility] Patient Relations at [phone] or email us at [email or web address]         Additional Information About Your Visit        Care EveryWhere ID     This is your Care EveryWhere ID. This could be used by other organizations to access your [place] medical records  [id number]        Your Vitals Were     Last Period                   03/26/2017             Blood Pressure from Last 3 Encounters:   03/29/17 137/88   03/27/17 132/82   03/15/17 133/90    Weight from Last 3 Encounters:   03/29/17 99.4 kg (219 lb 1.6 oz)   03/27/17 99.1 kg (218 lb 6.4 oz)   03/15/17 103.4 kg (228 lb)              Today, you had the following     No orders found for display       Primary Care Provider Office Phone # Fax #    Aminata Tamie Molina [phone]       [facility] [address] [facility] [address]        Thank you!     Thank you for choosing EYE CLINIC  for your care. Our goal is always to provide you with excellent care. Hearing back from our patients is one way we can continue to improve our services. Please take a few minutes to complete the written survey that you may receive in the mail after your visit with us. Thank you!             Your Updated Medication List - Protect others around you: Learn how to safely use, store and throw away your medicines at www.disposemymeds.org.          This list is accurate as of: 4/6/17  2:28 PM.  Always use your most recent med list.                   Brand Name Dispense Instructions for use    Adult Blood Pressure Cuff Lg Kit     1 kit    1 Device as needed       aspirin 81 MG chewable tablet      Take 81 mg by mouth       * hydrochlorothiazide 25 MG tablet    HYDRODIURIL         * hydrochlorothiazide 25 MG tablet    HYDRODIURIL    30 tablet    Take 0.5 tablets (12.5 mg) by mouth daily       ibuprofen 600 MG tablet    ADVIL/MOTRIN    60 tablet    Take 1 tablet (600 mg) by mouth every 6 hours as needed for moderate pain       vitamin D 2000 UNITS tablet     100 tablet    Take 1 tablet by mouth daily       * Notice:  This list has 2 medication(s) that are the same as other medications prescribed for you. Read the directions carefully, and ask your doctor or other care provider to review them with you.

## 2017-04-06 NOTE — PROGRESS NOTES
Assessment & Plan      Juanita Salgado is a 41 year old female with the following diagnoses:   1. Bilateral dry eyes    2. Prediabetes         No retinopathy  Stressed good glycemic and hypertensive control  Refractive options reviewed  Refraction given   Monitor yearly       Patient disposition:   Return in about 1 year (around 4/6/2018) for DFE.          Attending Physician Attestation: Complete documentation of historical and exam elements from today's encounter can be found in the full encounter summary report (not reduplicated in this progress note). I personally obtained the chief complaint(s) and history of present illness.  I confirmed and edited as necessary the review of systems, past medical/surgical history, family history, social history, and examination findings as documented by others; and I examined the patient myself. I personally reviewed the relevant tests, images, and reports as documented above. I formulated and edited as necessary the assessment and plan and discussed the findings and management plan with the patient and family. - Everett Anguiano MD 2:28 PM 4/6/2017

## 2017-04-12 ENCOUNTER — OFFICE VISIT (OUTPATIENT)
Dept: OBGYN | Facility: CLINIC | Age: 42
End: 2017-04-12
Attending: STUDENT IN AN ORGANIZED HEALTH CARE EDUCATION/TRAINING PROGRAM
Payer: COMMERCIAL

## 2017-04-12 VITALS
SYSTOLIC BLOOD PRESSURE: 136 MMHG | BODY MASS INDEX: 31.42 KG/M2 | HEART RATE: 99 BPM | WEIGHT: 219 LBS | DIASTOLIC BLOOD PRESSURE: 82 MMHG

## 2017-04-12 DIAGNOSIS — R30.0 DYSURIA: Primary | ICD-10-CM

## 2017-04-12 DIAGNOSIS — R10.2 PELVIC PAIN IN FEMALE: ICD-10-CM

## 2017-04-12 DIAGNOSIS — I10 BENIGN ESSENTIAL HYPERTENSION: ICD-10-CM

## 2017-04-12 LAB
ALBUMIN UR-MCNC: 30 MG/DL
APPEARANCE UR: ABNORMAL
BILIRUB UR QL STRIP: NEGATIVE
COLOR UR AUTO: YELLOW
GLUCOSE UR STRIP-MCNC: NEGATIVE MG/DL
HGB UR QL STRIP: NEGATIVE
KETONES UR STRIP-MCNC: NEGATIVE MG/DL
LEUKOCYTE ESTERASE UR QL STRIP: NEGATIVE
MUCOUS THREADS #/AREA URNS LPF: PRESENT /LPF
NITRATE UR QL: NEGATIVE
PH UR STRIP: 5.5 PH (ref 5–7)
RBC #/AREA URNS AUTO: 2 /HPF (ref 0–2)
SP GR UR STRIP: 1.02 (ref 1–1.03)
SQUAMOUS #/AREA URNS AUTO: 1 /HPF (ref 0–1)
URATE CRY #/AREA URNS HPF: ABNORMAL /HPF
URN SPEC COLLECT METH UR: ABNORMAL
UROBILINOGEN UR STRIP-MCNC: NORMAL MG/DL (ref 0–2)
WBC #/AREA URNS AUTO: 1 /HPF (ref 0–2)

## 2017-04-12 PROCEDURE — 81001 URINALYSIS AUTO W/SCOPE: CPT | Performed by: STUDENT IN AN ORGANIZED HEALTH CARE EDUCATION/TRAINING PROGRAM

## 2017-04-12 RX ORDER — HYDROCHLOROTHIAZIDE 25 MG/1
12.5 TABLET ORAL DAILY
Qty: 30 TABLET | Refills: 6 | Status: CANCELLED | OUTPATIENT
Start: 2017-04-12

## 2017-04-12 ASSESSMENT — PAIN SCALES - GENERAL: PAINLEVEL: SEVERE PAIN (6)

## 2017-04-12 NOTE — LETTER
2017       RE: Juanita Salgado  [address]  [place] [facility] [address]     Dear Colleague,    Thank you for referring your patient, Juanita Salgado, to the [facility] at [facility]. Please see a copy of my visit note below.    [facility]  Gy[facility]cology Visit follow up    Subjective   Juanita Salgado is a 42 year old , here for follow up of pelvic pain related to menses. Reports the pain is improved with Ibuprofen and Tylenol. She has not yet obtained the pelvic ultrasound. She was scheduled to have an ultrasound this morning, but for unclear reasons it was canceled. She would like to reschedule the ultrasound for another day because her son is currently admitted to the hospital.     She also reports over the past 6 months, she has had urinary incontinence associated with laughing and coughing. She also notes when she holds her urine for a long period of time, she sometimes leaks urine before she can reach the bathroom. Her pelvic pain is worse when her bladder is full. She also reports pain with urination.     Objective  /82  Pulse 99  Wt 99.3 kg (219 lb)  LMP 2017  Breastfeeding? No  BMI 31.42 kg/m2  Gen: Well-appearing, NAD    Assessment  Juanita Salgado is a 42 year old  female here for follow up of pelvic pain.    Plan  1. Pelvic pain  1. Pelvic US re-ordered. Instructed her to obtain the ultrasound, then make an appointment to be seen (or make both appointments on the same day).  2. Continue ibuprofen and tylenol for symptomatic relief     2. Urinary incontinence, likely stress incontinence   1. Urinalysis with reflux to culture ordered  2. Instructed her use the restroom when she has the urge to urinate, don't delay urination because that can cause overflow incontinence.  3. Not interested in a referral to Urogynecology because her symptoms are mild     Return to clinic after pelvic ultrasound    Staffed with   Brandin Vail MD   Resident Physician, PGY1  Obstetrics, Gynecology, and Women's Health    I agree with note as above.  Assessment and plan were jointly made.  Dilia Ghotra MD

## 2017-04-12 NOTE — PROGRESS NOTES
[facility]  Gynecology Visit follow up    Subjective   Juanita Salgado is a 42 year old , here for follow up of pelvic pain related to menses. Reports the pain is improved with Ibuprofen and Tylenol. She has not yet obtained the pelvic ultrasound. She was scheduled to have an ultrasound this morning, but for unclear reasons it was canceled. She would like to reschedule the ultrasound for another day because her son is currently admitted to the hospital.     She also reports over the past 6 months, she has had urinary incontinence associated with laughing and coughing. She also notes when she holds her urine for a long period of time, she sometimes leaks urine before she can reach the bathroom. Her pelvic pain is worse when her bladder is full. She also reports pain with urination.     Objective  /82  Pulse 99  Wt 99.3 kg (219 lb)  LMP 2017  Breastfeeding? No  BMI 31.42 kg/m2  Gen: Well-appearing, NAD    Assessment  Juanita Salgado is a 42 year old  female here for follow up of pelvic pain.    Plan  1. Pelvic pain  1. Pelvic US re-ordered. Instructed her to obtain the ultrasound, then make an appointment to be seen (or make both appointments on the same day).  2. Continue ibuprofen and tylenol for symptomatic relief     2. Urinary incontinence, likely stress incontinence   1. Urinalysis with reflux to culture ordered  2. Instructed her use the restroom when she has the urge to urinate, don't delay urination because that can cause overflow incontinence.  3. Not interested in a referral to Urogynecology because her symptoms are mild     Return to clinic after pelvic ultrasound    Staffed with Dr. Brandin Vail MD   Resident Physician, PGY1  Obstetrics, Gynecology, and Women's Health    I agree with note as above.  Assessment and plan were jointly made.  Dilia Ghotra MD

## 2017-04-12 NOTE — NURSING NOTE
Chief Complaint   Patient presents with     RECHECK     Follow-uppelvic ULS , C/O pelvic pain   Barbi Garza LPN

## 2017-04-12 NOTE — MR AVS SNAPSHOT
After Visit Summary   4/12/2017    Juanita Salgado    MRN: [id number]           Patient Information     Date Of Birth          1975        Visit Information        Provider Department      4/12/2017 2:45 PM Catia Vail MD [facility]        Today's Diagnoses     Dysuria    -  1    Benign essential hypertension        Pelvic pain in female          Care Instructions    Schedule a pelvic ultrasound. After the ultrasound, schedule an appointment to be seen.        Follow-ups after your visit        Future tests that were ordered for you today     Open Future Orders        Priority Expected Expires Ordered    US GYN Complete Transvaginal - 52435 (In Clinic) Routine 4/18/2017 8/10/2017 4/12/2017            Who to contact     Please call your clinic at [phone] to:    Ask questions about your health    Make or cancel appointments    Discuss your medicines    Learn about your test results    Speak to your doctor   If you have compliments or concerns about an experience at your clinic, or if you wish to file a complaint, please contact [facility] Patient Relations at [phone] or email us at [email or web address]         Additional Information About Your Visit        Care EveryWhere ID     This is your Care EveryWhere ID. This could be used by other organizations to access your [place] medical records  [id number]        Your Vitals Were     Pulse Last Period Breastfeeding? BMI (Body Mass Index)          99 03/26/2017 No 31.42 kg/m2         Blood Pressure from Last 3 Encounters:   04/12/17 136/82   03/29/17 137/88   03/27/17 132/82    Weight from Last 3 Encounters:   04/12/17 99.3 kg (219 lb)   03/29/17 99.4 kg (219 lb 1.6 oz)   03/27/17 99.1 kg (218 lb 6.4 oz)              We Performed the Following     Routine UA with micro reflex to culture (Collected in Clinic)          Today's Medication Changes          These changes are accurate as  of: 4/12/17  3:13 PM.  If you have any questions, ask your nurse or doctor.               These medicines have changed or have updated prescriptions.        Dose/Directions    hydrochlorothiazide 25 MG tablet   Commonly known as:  HYDRODIURIL   This may have changed:  Another medication with the same name was removed. Continue taking this medication, and follow the directions you see here.   Used for:  Benign essential hypertension   Changed by:  Rosa Simental MD        Dose:  12.5 mg   Take 0.5 tablets (12.5 mg) by mouth daily   Quantity:  30 tablet   Refills:  6                Primary Care Provider Office Phone # Fax #    Aminata Tamie Molina [phone]       [facility] [address] [facility] [address]        Thank you!     Thank you for choosing [facility]  for your care. Our goal is always to provide you with excellent care. Hearing back from our patients is one way we can continue to improve our services. Please take a few minutes to complete the written survey that you may receive in the mail after your visit with us. Thank you!             Your Updated Medication List - Protect others around you: Learn how to safely use, store and throw away your medicines at www.disposemymeds.org.          This list is accurate as of: 4/12/17  3:13 PM.  Always use your most recent med list.                   Brand Name Dispense Instructions for use    Adult Blood Pressure Cuff Lg Kit     1 kit    1 Device as needed       aspirin 81 MG chewable tablet      Take 81 mg by mouth       hydrochlorothiazide 25 MG tablet    HYDRODIURIL    30 tablet    Take 0.5 tablets (12.5 mg) by mouth daily       ibuprofen 600 MG tablet    ADVIL/MOTRIN    60 tablet    Take 1 tablet (600 mg) by mouth every 6 hours as needed for moderate pain       vitamin D 2000 UNITS tablet     100 tablet    Take 1 tablet by mouth daily

## 2017-04-26 ENCOUNTER — TELEPHONE (OUTPATIENT)
Dept: FAMILY MEDICINE | Facility: CLINIC | Age: 42
End: 2017-04-26

## 2017-04-26 DIAGNOSIS — I10 BENIGN ESSENTIAL HYPERTENSION: ICD-10-CM

## 2017-04-26 RX ORDER — HYDROCHLOROTHIAZIDE 25 MG/1
12.5 TABLET ORAL DAILY
Qty: 30 TABLET | Refills: 6 | Status: SHIPPED | OUTPATIENT
Start: 2017-04-26 | End: 2017-05-23

## 2017-04-26 NOTE — TELEPHONE ENCOUNTER
[facility] phone call message- patient requesting a refill:    Full Medication Name: hydrochlorothiazide (HYDRODIURIL) 25 MG tablet    Dose: Take 0.5 tablets (12.5 mg) by mouth daily    Pharmacy confirmed as   [place], [place] - [address]  : Yes    Additional Comments: Patient is out of med listed above    OK to leave a message on voice mail? Yes    Primary language: Dominican      needed? Yes    Call taken on April 26, 2017 at 1:27 PM by Jovita Weber

## 2017-04-26 NOTE — TELEPHONE ENCOUNTER
Refilled med with 6 refills.     Aminata Molina MD  [facility]  [phone]

## 2017-04-26 NOTE — TELEPHONE ENCOUNTER
Message routed to PCP at high priority and page placed to refill if appropriate. Patient's las office visit was 3/27/17.    Pau Jimenez RN

## 2017-05-03 ENCOUNTER — OFFICE VISIT (OUTPATIENT)
Dept: OBGYN | Facility: CLINIC | Age: 42
End: 2017-05-03
Payer: COMMERCIAL

## 2017-05-03 VITALS
SYSTOLIC BLOOD PRESSURE: 131 MMHG | DIASTOLIC BLOOD PRESSURE: 85 MMHG | BODY MASS INDEX: 30.71 KG/M2 | HEART RATE: 79 BPM | WEIGHT: 214 LBS

## 2017-05-03 DIAGNOSIS — R10.2 PELVIC PAIN IN FEMALE: ICD-10-CM

## 2017-05-03 DIAGNOSIS — R10.2 PELVIC PAIN IN FEMALE: Primary | ICD-10-CM

## 2017-05-03 PROCEDURE — 76830 TRANSVAGINAL US NON-OB: CPT | Mod: ZF

## 2017-05-03 PROCEDURE — 87491 CHLMYD TRACH DNA AMP PROBE: CPT | Performed by: STUDENT IN AN ORGANIZED HEALTH CARE EDUCATION/TRAINING PROGRAM

## 2017-05-03 PROCEDURE — 87591 N.GONORRHOEAE DNA AMP PROB: CPT | Performed by: STUDENT IN AN ORGANIZED HEALTH CARE EDUCATION/TRAINING PROGRAM

## 2017-05-03 PROCEDURE — 99212 OFFICE O/P EST SF 10 MIN: CPT | Mod: 25,ZF

## 2017-05-03 RX ORDER — RIFAMPIN 300 MG/1
300 CAPSULE ORAL
COMMUNITY
Start: 2017-05-02 | End: 2017-05-12

## 2017-05-03 RX ORDER — MUPIROCIN 20 MG/G
OINTMENT TOPICAL
COMMUNITY
Start: 2017-05-02 | End: 2017-05-12

## 2017-05-03 NOTE — PROGRESS NOTES
"[facility]  Gynecology Visit follow up    Subjective   Juanita Salgado is a 42 year old , here for follow up of pelvic pain. Reports still has uterine cramping with periods, which improves with ibuprofen and tylenol. Normal menstrual cycles, menses are not heavy. She was hoping for a permanent solution to her pelvic pain so she does not have to continue using medications to treat the pain. She is wondering if the pain could be from a sexually transmitted infection. She would like gonorrhea/chlamydia testing.    Objective  /85  Pulse 79  Wt 97.1 kg (214 lb)  LMP 2017  Breastfeeding? No  BMI 30.71 kg/m2  Gen: Well-appearing, NAD    Pelvic:  Normal appearing external female genitalia. Normal hair distribution. Vagina is without lesions. Scant vaginal discharge. Cervix multiparous, no lesions.     Assessment  Juanita Salgado is a 42 year old  female here for follow up of pelvic pain.     Plan  1. Pelvic pain  1. Pelvic ultrasound normal.   2. Gonorrhea/Chlamydia collected. She would like to be called regarding results.  3. Discussed with patient that there is no \"cure\" for uterine cramping. She should continue Ibuprofen and Tylenol as needed for symptomatic relief. She was instructed to continue monitoring her symptoms, and to return to clinic if symptoms worsen or if she develops new symptoms.     Return to clinic as needed.     Staffed with Dr. Brandin Vail MD   Resident Physician, PGY1  Obstetrics, Gynecology, and Women's Health    I agree with note as above.  Assessment and plan were jointly made.  Dilia Ghotra MD    "

## 2017-05-03 NOTE — MR AVS SNAPSHOT
After Visit Summary   5/3/2017    Juanita Salgado    MRN: [id number]           Patient Information     Date Of Birth          1975        Visit Information        Provider Department      5/3/2017 1:45 PM Catia Vail MD [facility]        Today's Diagnoses     Pelvic pain in female    -  1       Follow-ups after your visit        Follow-up notes from your care team     Return if symptoms worsen or fail to improve.      Who to contact     Please call your clinic at [phone] to:    Ask questions about your health    Make or cancel appointments    Discuss your medicines    Learn about your test results    Speak to your doctor   If you have compliments or concerns about an experience at your clinic, or if you wish to file a complaint, please contact [facility] Patient Relations at [phone] or email us at [email or web address]         Additional Information About Your Visit        Care EveryWhere ID     This is your Care EveryWhere ID. This could be used by other organizations to access your [place] medical records  [id number]        Your Vitals Were     Pulse Last Period Breastfeeding? BMI (Body Mass Index)          79 04/26/2017 No 30.71 kg/m2         Blood Pressure from Last 3 Encounters:   05/03/17 131/85   04/12/17 136/82   03/29/17 137/88    Weight from Last 3 Encounters:   05/03/17 97.1 kg (214 lb)   04/12/17 99.3 kg (219 lb)   03/29/17 99.4 kg (219 lb 1.6 oz)              We Performed the Following     Chlamydia trachomatis PCR (Clinic Collect)     Gonorrhea PCR        Primary Care Provider Office Phone # Fax #    Aminata Molina [phone]       [facility] [address] [facility] [address]        Thank you!     Thank you for choosing [facility]  for your care. Our goal is always to provide you with excellent care. Hearing back from our patients is one way we can  continue to improve our services. Please take a few minutes to complete the written survey that you may receive in the mail after your visit with us. Thank you!             Your Updated Medication List - Protect others around you: Learn how to safely use, store and throw away your medicines at www.disposemymeds.org.          This list is accurate as of: 5/3/17  2:31 PM.  Always use your most recent med list.                   Brand Name Dispense Instructions for use    Adult Blood Pressure Cuff Lg Kit     1 kit    1 Device as needed       aspirin 81 MG chewable tablet      Take 81 mg by mouth       hydrochlorothiazide 25 MG tablet    HYDRODIURIL    30 tablet    Take 0.5 tablets (12.5 mg) by mouth daily       ibuprofen 600 MG tablet    ADVIL/MOTRIN    60 tablet    Take 1 tablet (600 mg) by mouth every 6 hours as needed for moderate pain       mupirocin 2 % ointment    BACTROBAN         rifampin 300 MG capsule    RIFADIN     Take 300 mg by mouth       vitamin D 2000 UNITS tablet     100 tablet    Take 1 tablet by mouth daily

## 2017-05-03 NOTE — MR AVS SNAPSHOT
After Visit Summary   5/3/2017    Juanita Salgado    MRN: [id number]           Patient Information     Date Of Birth          1975        Visit Information        Provider Department      5/3/2017 1:00 PM [facility] ULTRASOUND [facility]        Today's Diagnoses     Pelvic pain in female           Follow-ups after your visit        Who to contact     Please call your clinic at [phone] to:    Ask questions about your health    Make or cancel appointments    Discuss your medicines    Learn about your test results    Speak to your doctor   If you have compliments or concerns about an experience at your clinic, or if you wish to file a complaint, please contact [facility] Patient Relations at [phone] or email us at [email or web address]         Additional Information About Your Visit        Care EveryWhere ID     This is your Care EveryWhere ID. This could be used by other organizations to access your [place] medical records  [id number]        Your Vitals Were     Last Period                   04/26/2017            Blood Pressure from Last 3 Encounters:   05/03/17 131/85   04/12/17 136/82   03/29/17 137/88    Weight from Last 3 Encounters:   05/03/17 97.1 kg (214 lb)   04/12/17 99.3 kg (219 lb)   03/29/17 99.4 kg (219 lb 1.6 oz)              We Performed the Following     US GYN Complete Transvaginal - 05938 (In Clinic)        Primary Care Provider Office Phone # Fax #    Aminata Tamie Molina [phone]       [facility] [address] [facility] [address]        Thank you!     Thank you for choosing [facility]  for your care. Our goal is always to provide you with excellent care. Hearing back from our patients is one way we can continue to improve our services. Please take a few minutes to complete the written survey that you may receive in the mail after your visit with us. Thank  you!             Your Updated Medication List - Protect others around you: Learn how to safely use, store and throw away your medicines at www.disposemymeds.org.          This list is accurate as of: 5/3/17 11:59 PM.  Always use your most recent med list.                   Brand Name Dispense Instructions for use    Adult Blood Pressure Cuff Lg Kit     1 kit    1 Device as needed       aspirin 81 MG chewable tablet      Take 81 mg by mouth       hydrochlorothiazide 25 MG tablet    HYDRODIURIL    30 tablet    Take 0.5 tablets (12.5 mg) by mouth daily       ibuprofen 600 MG tablet    ADVIL/MOTRIN    60 tablet    Take 1 tablet (600 mg) by mouth every 6 hours as needed for moderate pain       mupirocin 2 % ointment    BACTROBAN         rifampin 300 MG capsule    RIFADIN     Take 300 mg by mouth       vitamin D 2000 UNITS tablet     100 tablet    Take 1 tablet by mouth daily

## 2017-05-03 NOTE — NURSING NOTE
Chief Complaint   Patient presents with     Follow Up For     Follow up uls       See DUDLEY Angela 5/3/2017

## 2017-05-04 LAB
C TRACH DNA SPEC QL NAA+PROBE: NORMAL
N GONORRHOEA DNA SPEC QL NAA+PROBE: NORMAL
SPECIMEN SOURCE: NORMAL
SPECIMEN SOURCE: NORMAL

## 2017-05-23 ENCOUNTER — OFFICE VISIT (OUTPATIENT)
Dept: FAMILY MEDICINE | Facility: CLINIC | Age: 42
End: 2017-05-23

## 2017-05-23 DIAGNOSIS — I10 BENIGN ESSENTIAL HYPERTENSION: ICD-10-CM

## 2017-05-23 RX ORDER — ASPIRIN 81 MG/1
81 TABLET, CHEWABLE ORAL DAILY
Qty: 90 TABLET | Refills: 3 | Status: SHIPPED | OUTPATIENT
Start: 2017-05-23 | End: 2017-07-18

## 2017-05-23 RX ORDER — HYDROCHLOROTHIAZIDE 25 MG/1
25 TABLET ORAL DAILY
Qty: 30 TABLET | Refills: 6 | Status: SHIPPED | OUTPATIENT
Start: 2017-05-23 | End: 2017-07-18

## 2017-05-23 NOTE — MR AVS SNAPSHOT
"              After Visit Summary   5/23/2017    Juanita Salgado    MRN: [id number]           Patient Information     Date Of Birth          1975        Visit Information        Provider Department      5/23/2017 11:20 AM Winston Segura MD [facility]        Today's Diagnoses     Benign essential hypertension           Follow-ups after your visit        Who to contact     Please call your clinic at [phone] to:    Ask questions about your health    Make or cancel appointments    Discuss your medicines    Learn about your test results    Speak to your doctor   If you have compliments or concerns about an experience at your clinic, or if you wish to file a complaint, please contact [facility] Patient Relations at [phone] or email us at [email or web address]         Additional Information About Your Visit        Care EveryWhere ID     This is your Care EveryWhere ID. This could be used by other organizations to access your [facility] medical records  [id number]        Your Vitals Were     Pulse Temperature Respirations Height Last Period Pulse Oximetry    78 98.3  F (36.8  C) (Oral) 18 5' 8\" (172.7 cm) 05/20/2017 99%    BMI (Body Mass Index)                   33.06 kg/m2            Blood Pressure from Last 3 Encounters:   05/24/17 136/85   05/03/17 131/85   04/12/17 136/82    Weight from Last 3 Encounters:   05/24/17 217 lb 6.4 oz (98.6 kg)   05/03/17 214 lb (97.1 kg)   04/12/17 219 lb (99.3 kg)              Today, you had the following     No orders found for display         Today's Medication Changes          These changes are accurate as of: 5/23/17 11:59 PM.  If you have any questions, ask your nurse or doctor.               These medicines have changed or have updated prescriptions.        Dose/Directions    aspirin 81 MG chewable tablet   This may have changed:  when to take this   Used for:  Benign essential hypertension        Dose:  81 mg "   Take 1 tablet (81 mg) by mouth daily   Quantity:  90 tablet   Refills:  3       hydrochlorothiazide 25 MG tablet   Commonly known as:  HYDRODIURIL   This may have changed:  how much to take   Used for:  Benign essential hypertension        Dose:  25 mg   Take 1 tablet (25 mg) by mouth daily   Quantity:  30 tablet   Refills:  6            Where to get your medicines      These medications were sent to [id number] - [facility] [address] [place] [address] [address] [facility] [address]     Phone:  [phone]     aspirin 81 MG chewable tablet    hydrochlorothiazide 25 MG tablet                Primary Care Provider Office Phone # Fax #    Aminata Tamie Molina [phone]       [facility] [address] [facility] [address]        Thank you!     Thank you for choosing [facility] MEDICINE [facility]  for your care. Our goal is always to provide you with excellent care. Hearing back from our patients is one way we can continue to improve our services. Please take a few minutes to complete the written survey that you may receive in the mail after your visit with us. Thank you!             Your Updated Medication List - Protect others around you: Learn how to safely use, store and throw away your medicines at www.disposemymeds.org.          This list is accurate as of: 5/23/17 11:59 PM.  Always use your most recent med list.                   Brand Name Dispense Instructions for use    Adult Blood Pressure Cuff Lg Kit     1 kit    1 Device as needed       aspirin 81 MG chewable tablet     90 tablet    Take 1 tablet (81 mg) by mouth daily       hydrochlorothiazide 25 MG tablet    HYDRODIURIL    30 tablet    Take 1 tablet (25 mg) by mouth daily       ibuprofen 600 MG tablet    ADVIL/MOTRIN    60 tablet    Take 1 tablet (600 mg) by mouth every 6 hours as needed for moderate pain       vitamin D 2000 UNITS tablet     100 tablet    Take 1  tablet by mouth daily

## 2017-05-24 VITALS
BODY MASS INDEX: 32.95 KG/M2 | DIASTOLIC BLOOD PRESSURE: 85 MMHG | SYSTOLIC BLOOD PRESSURE: 136 MMHG | WEIGHT: 217.4 LBS | TEMPERATURE: 98.3 F | RESPIRATION RATE: 18 BRPM | HEIGHT: 68 IN | HEART RATE: 78 BPM | OXYGEN SATURATION: 99 %

## 2017-06-03 NOTE — PROGRESS NOTES
IVETH Salgado is a 42 year old  female  who presents for bp follow up and med refill.       Hypertension Follow-up      Outpatient blood pressures are not being checked.    Chest Pain? :No     Low Salt Diet: no added salt    Daily NSAID Use?No     Did patient take their HTN pills today/last night as usual?  Yes         Adherence and Exercise  Medication side effects: no  How often is a medication missed? Never  Are you able to follow the treatment plan? Yes  Exercise:walking  2-3 days/week for an average of 30-45 minutes       Patient Active Problem List    Diagnosis Date Noted     Cough 10/05/2015     Priority: High     Class: Acute     Hyperlipidemia, unspecified hyperlipidemia type 04/02/2017     Priority: Medium     ASCVD Score: 5.3% 10 year ASCVD Risk   HDL 36 , Tot Chol 214,  (10/4/16)       Furuncle 04/02/2017     Priority: Medium     Bartholin gland cyst 04/02/2017     Priority: Medium     Cerebral aneurysm, nonruptured 03/22/2017     Priority: Medium     Asymptomatic, 2.5mm, low risk of rupture. Incidental small right anterior temporal MCA aneurysm from on CT angio of head 3/20/2017. Recommend follow up MR angiogram in 1 year (March 2018). Neuro Interventional Radiology Inpatient Consult 3/17/2017, recommend daily aspirin.       Prediabetes 04/21/2016     Priority: Medium     Hypertension, benign essential, goal below 140/90 07/07/2015     Priority: Medium     Has small anterior temporal MCA aneurysm, 2.5mm, found on CT angio of head 3/20/17         Current Outpatient Prescriptions   Medication Sig Dispense Refill     hydrochlorothiazide (HYDRODIURIL) 25 MG tablet Take 1 tablet (25 mg) by mouth daily 30 tablet 6     aspirin 81 MG chewable tablet Take 1 tablet (81 mg) by mouth daily 90 tablet 3     ibuprofen (ADVIL/MOTRIN) 600 MG tablet Take 1 tablet (600 mg) by mouth every 6 hours as needed for moderate pain 60 tablet 1     Cholecalciferol (VITAMIN D) 2000 UNITS tablet Take 1 tablet by  "mouth daily 100 tablet 3     Blood Pressure Monitoring (ADULT BLOOD PRESSURE CUFF LG) KIT 1 Device as needed 1 kit 0          Allergies   Allergen Reactions     Seasonal Allergies              +++++++      Problem, Medication and Allergy Lists were reviewed and updated if needed..    Patient is an established patient of this clinic..         Review of Systems:   General: No distress              Physical Exam:     Vitals:    05/24/17 0914   BP: 136/85   Pulse: 78   Resp: 18   Temp: 98.3  F (36.8  C)   TempSrc: Oral   SpO2: 99%   Weight: 217 lb 6.4 oz (98.6 kg)   Height: 5' 8\" (172.7 cm)     Body mass index is 33.06 kg/(m^2).    Constitutional: Awake, alert, cooperative, no apparent distress, and appears stated age  HEENT: MMM, no conjunctival pallor  Pulm: CTAB, No rales, No wheeze, no increased effort of breathing.  CVS: RRR, No murmurs   GI: S NT ND BS +ve  Skin : no  rash  Neuro:  No focal neurological deficit  Psych : Good eye contact, well groomed, very interactive and collaborative. Good insight. Thought process is linear and coherent. Associations are tight. Mood is good. Affect euthymic.         No results found for this or any previous visit (from the past 24 hour(s)).    Assessment and Plan      1. Benign essential hypertension- refilled meds. BP at goal.   - hydrochlorothiazide (HYDRODIURIL) 25 MG tablet; Take 1 tablet (25 mg) by mouth daily  Dispense: 30 tablet; Refill: 6  - aspirin 81 MG chewable tablet; Take 1 tablet (81 mg) by mouth daily  Dispense: 90 tablet; Refill: 3    Medications Discontinued During This Encounter   Medication Reason     hydrochlorothiazide (HYDRODIURIL) 25 MG tablet Reorder     aspirin 81 MG chewable tablet Reorder       Options for treatment and follow-up care were reviewed with the patient. Juanita Salgado  engaged in the decision making process and verbalized understanding of the options discussed and agreed with the final plan.    Winston Segura MD [address] " [facility]  Family Medicine Resident  Pager [phone]

## 2017-06-05 NOTE — PROGRESS NOTES
Preceptor Attestation:   Patient seen and discussed with the resident. Assessment and plan reviewed with resident and agreed upon.   Supervising Physician:  Shakira Saucedo MD  [facility]

## 2017-07-18 ENCOUNTER — OFFICE VISIT (OUTPATIENT)
Dept: FAMILY MEDICINE | Facility: CLINIC | Age: 42
End: 2017-07-18

## 2017-07-18 VITALS
TEMPERATURE: 98.6 F | HEIGHT: 68 IN | WEIGHT: 210.8 LBS | HEART RATE: 80 BPM | DIASTOLIC BLOOD PRESSURE: 80 MMHG | SYSTOLIC BLOOD PRESSURE: 130 MMHG | OXYGEN SATURATION: 100 % | RESPIRATION RATE: 18 BRPM | BODY MASS INDEX: 31.95 KG/M2

## 2017-07-18 DIAGNOSIS — G44.209 TENSION HEADACHE: ICD-10-CM

## 2017-07-18 DIAGNOSIS — J34.89 SINUS PRESSURE: ICD-10-CM

## 2017-07-18 DIAGNOSIS — J06.9 VIRAL URI: ICD-10-CM

## 2017-07-18 DIAGNOSIS — I10 BENIGN ESSENTIAL HYPERTENSION: ICD-10-CM

## 2017-07-18 DIAGNOSIS — R07.0 THROAT PAIN: Primary | ICD-10-CM

## 2017-07-18 LAB — S PYO AG THROAT QL IA.RAPID: NEGATIVE

## 2017-07-18 RX ORDER — ACETAMINOPHEN 500 MG
1000 TABLET ORAL EVERY 8 HOURS PRN
Qty: 100 TABLET | Refills: 0 | Status: SHIPPED | OUTPATIENT
Start: 2017-07-18 | End: 2018-07-17

## 2017-07-18 RX ORDER — ASPIRIN 81 MG/1
81 TABLET, CHEWABLE ORAL DAILY
Qty: 90 TABLET | Refills: 3 | Status: SHIPPED | OUTPATIENT
Start: 2017-07-18 | End: 2018-04-17

## 2017-07-18 RX ORDER — HYDROCHLOROTHIAZIDE 25 MG/1
25 TABLET ORAL DAILY
Qty: 30 TABLET | Refills: 6 | Status: SHIPPED | OUTPATIENT
Start: 2017-07-18 | End: 2018-04-17

## 2017-07-18 ASSESSMENT — ENCOUNTER SYMPTOMS
NAUSEA: 0
CONSTIPATION: 0
UNEXPECTED WEIGHT CHANGE: 0
SORE THROAT: 1
RHINORRHEA: 0
FATIGUE: 1
FEVER: 0
SINUS PRESSURE: 1
ARTHRALGIAS: 0
APPETITE CHANGE: 0
ABDOMINAL PAIN: 0
SHORTNESS OF BREATH: 0
VOMITING: 0
ACTIVITY CHANGE: 1
CHILLS: 0
HEADACHES: 1
DIARRHEA: 0
MYALGIAS: 0
LIGHT-HEADEDNESS: 0
DYSURIA: 0
DIZZINESS: 1
COUGH: 1

## 2017-07-18 NOTE — LETTER
July 21, 2017      Juanita Salgado  [address]  [place] [facility] [place] [address]        Dear [facility],    Thank you for getting your care at [facility]. Please see below for your test results.    Resulted Orders   Strep Screen Rapid (Group) ([facility])   Result Value Ref Range    Rapid Strep A Screen NEGATIVE Negative   Strep Culture (GABS)   Result Value Ref Range    Specimen Description Throat     Special Requests Specimen collected in eSwab transport (white cap)     Culture Micro No Beta Streptococcus isolated     Micro Report Status FINAL 07/20/2017        If you have any concerns about these results please call and leave a message for me or send a UpNext message to the clinic.    Sincerely,    Damir Doe MD

## 2017-07-18 NOTE — PROGRESS NOTES
Preceptor Attestation:   Patient seen and discussed with the resident. Assessment and plan reviewed with resident and agreed upon.   Supervising Physician:  Shakira Saucedo MD  [facility]

## 2017-07-18 NOTE — MR AVS SNAPSHOT
After Visit Summary   7/18/2017    Juanita Salgaod    MRN: [id number]           Patient Information     Date Of Birth          1975        Visit Information        Provider Department      7/18/2017 9:20 AM Damir Holley MD [facility]        Today's Diagnoses     Throat pain    -  1    Viral URI        Tension headache        Sinus pressure        Benign essential hypertension          Care Instructions    Here is the plan from today's visit    1. Viral URI  - acetaminophen (TYLENOL) 500 MG tablet; Take 2 tablets (1,000 mg) by mouth every 8 hours as needed for mild pain  Dispense: 100 tablet; Refill: 0    2. Throat pain  - Strep Screen Rapid (Group) ([facility])  - acetaminophen (TYLENOL) 500 MG tablet; Take 2 tablets (1,000 mg) by mouth every 8 hours as needed for mild pain  Dispense: 100 tablet; Refill: 0  - Phenol-Glycerin (CHLORASEPTIC MAX SORE THROAT) 1.5-33 % LIQD; Take 2 sprays by mouth every 2 hours as needed for sore throat  Dispense: 30 mL; Refill: 0  - Benzocaine 10 MG LOZG; Take 1 lozenge by mouth every 2 hours as needed For throat pain  Dispense: 24 lozenge; Refill: 3    3. Tension headache  - acetaminophen (TYLENOL) 500 MG tablet; Take 2 tablets (1,000 mg) by mouth every 8 hours as needed for mild pain  Dispense: 100 tablet; Refill: 0    4. Sinus pressure      5. Benign essential hypertension  - hydrochlorothiazide (HYDRODIURIL) 25 MG tablet; Take 1 tablet (25 mg) by mouth daily  Dispense: 30 tablet; Refill: 6  - aspirin 81 MG chewable tablet; Take 1 tablet (81 mg) by mouth daily  Dispense: 90 tablet; Refill: 3      Please call or return to clinic if your symptoms don't go away.    Follow up plan  Please make a clinic appointment for follow up with me (DAMIR HOLLEY) as needed  Thank you for coming to [facility] today.  Lab Testing:  **If you had lab testing today and your results are reassuring or normal they will be mailed to you or sent  through [employer] within 7 days.   **If the lab tests need quick action we will call you with the results.  The phone number we will call with results is # [phone] (home) . If this is not the best number please call our clinic and change the number.  Medication Refills:  If you need any refills please call your pharmacy and they will contact us.   If you need to  your refill at a new pharmacy, please contact the new pharmacy directly. The new pharmacy will help you get your medications transferred faster.   Scheduling:  If you have any concerns about today's visit or wish to schedule another appointment please call our office during normal business hours [phone] (8-5:00 M-F)  If a referral was made to a [facility] and you don't get a call from central scheduling please call [phone].  If a Mammogram was ordered for you at [facility] call [phone] to schedule or change your appointment.  If you had an XRay/CT/Ultrasound/MRI ordered the number is [phone] to schedule or change your radiology appointment.   Medical Concerns:  If you have urgent medical concerns please call [phone] at any time of the day.            Follow-ups after your visit        Follow-up notes from your care team     Return if symptoms worsen or fail to improve.      Who to contact     Please call your clinic at [phone] to:    Ask questions about your health    Make or cancel appointments    Discuss your medicines    Learn about your test results    Speak to your doctor   If you have compliments or concerns about an experience at your clinic, or if you wish to file a complaint, please contact [facility] Patient Relations at [phone] or email us at [email or web address]         Additional Information About Your Visit        [employer] Information     [employer] is an electronic gateway that provides easy, online access to your medical records.  "With [employer], you can request a clinic appointment, read your test results, renew a prescription or communicate with your care team.     To sign up for [employer] visit the website at [email or web address]   You will be asked to enter the access code listed below, as well as some personal information. Please follow the directions to create your username and password.     Your access code is: [email or web address]  Expires: 10/16/2017  9:38 AM     Your access code will  in 90 days. If you need help or a new code, please contact your [facility] or call [phone] for assistance.        Care EveryWhere ID     This is your Care EveryWhere ID. This could be used by other organizations to access your [facility] medical records  [id number]        Your Vitals Were     Pulse Temperature Respirations Height Last Period Pulse Oximetry    80 98.6  F (37  C) 18 5' 8\" (172.7 cm) 2017 (Within Days) 100%    BMI (Body Mass Index)                   32.05 kg/m2            Blood Pressure from Last 3 Encounters:   17 130/80   17 136/85   17 131/85    Weight from Last 3 Encounters:   17 210 lb 12.8 oz (95.6 kg)   17 217 lb 6.4 oz (98.6 kg)   17 214 lb (97.1 kg)              We Performed the Following     Strep Screen Rapid (Group) ([facility])          Today's Medication Changes          These changes are accurate as of: 17  9:38 AM.  If you have any questions, ask your nurse or doctor.               Start taking these medicines.        Dose/Directions    acetaminophen 500 MG tablet   Commonly known as:  TYLENOL   Used for:  Viral URI, Throat pain, Tension headache   Started by:  Damir Doe MD        Dose:  1000 mg   Take 2 tablets (1,000 mg) by mouth every 8 hours as needed for mild pain   Quantity:  100 tablet   Refills:  0       Benzocaine 10 MG Lozg   Used for:  Throat pain   Started by:  Damir Doe MD        Dose:  1 lozenge "   Take 1 lozenge by mouth every 2 hours as needed For throat pain   Quantity:  24 lozenge   Refills:  3       Phenol-Glycerin 1.5-33 % Liqd   Commonly known as:  CHLORASEPTIC MAX SORE THROAT   Used for:  Throat pain   Started by:  Damir Doe MD        Dose:  2 spray   Take 2 sprays by mouth every 2 hours as needed for sore throat   Quantity:  30 mL   Refills:  0            Where to get your medicines      These medications were sent to [facility] [place], [place] - [address] [facility] [address]     Phone:  [phone]     acetaminophen 500 MG tablet    aspirin 81 MG chewable tablet    Benzocaine 10 MG Lozg    hydrochlorothiazide 25 MG tablet    Phenol-Glycerin 1.5-33 % Liqd                Primary Care Provider Office Phone # Fax #    Aminata Tamie Molina [phone]       [facility] [address]  [facility] [address]        Equal Access to Services     LINWOOD SHOOK AH: Hadii aad ku hadasho Soomaali, waaxda luqadaha, qaybta kaalmada adeegyada, waxay idiin hayaan adeeg kharash la'[place] . So [facility] [phone].    ATENCIÓN: Si habla español, tiene a cote disposición servicios gratuitos de asistencia lingüística. [facility] [phone].    We comply with applicable federal civil rights laws and [place] laws. We do not discriminate on the basis of race, color, national origin, age, disability sex, sexual orientation or gender identity.            Thank you!     Thank you for choosing [facility]  for your care. Our goal is always to provide you with excellent care. Hearing back from our patients is one way we can continue to improve our services. Please take a few minutes to complete the written survey that you may receive in the mail after your visit with us. Thank you!             Your Updated Medication List - Protect others around you: Learn how to safely use, store and throw away your medicines at  www.disposemymeds.org.          This list is accurate as of: 7/18/17  9:38 AM.  Always use your most recent med list.                   Brand Name Dispense Instructions for use Diagnosis    acetaminophen 500 MG tablet    TYLENOL    100 tablet    Take 2 tablets (1,000 mg) by mouth every 8 hours as needed for mild pain    Viral URI, Throat pain, Tension headache       Adult Blood Pressure Cuff Lg Kit     1 kit    1 Device as needed    Benign essential hypertension       aspirin 81 MG chewable tablet     90 tablet    Take 1 tablet (81 mg) by mouth daily    Benign essential hypertension       Benzocaine 10 MG Lozg     24 lozenge    Take 1 lozenge by mouth every 2 hours as needed For throat pain    Throat pain       hydrochlorothiazide 25 MG tablet    HYDRODIURIL    30 tablet    Take 1 tablet (25 mg) by mouth daily    Benign essential hypertension       ibuprofen 600 MG tablet    ADVIL/MOTRIN    60 tablet    Take 1 tablet (600 mg) by mouth every 6 hours as needed for moderate pain    Dysmenorrhea       Phenol-Glycerin 1.5-33 % Liqd    CHLORASEPTIC MAX SORE THROAT    30 mL    Take 2 sprays by mouth every 2 hours as needed for sore throat    Throat pain       vitamin D 2000 UNITS tablet     100 tablet    Take 1 tablet by mouth daily    Healthcare maintenance

## 2017-07-18 NOTE — PATIENT INSTRUCTIONS
Here is the plan from today's visit    1. Viral URI  - acetaminophen (TYLENOL) 500 MG tablet; Take 2 tablets (1,000 mg) by mouth every 8 hours as needed for mild pain  Dispense: 100 tablet; Refill: 0    2. Throat pain  - Strep Screen Rapid (Group) ([facility])  - acetaminophen (TYLENOL) 500 MG tablet; Take 2 tablets (1,000 mg) by mouth every 8 hours as needed for mild pain  Dispense: 100 tablet; Refill: 0  - Phenol-Glycerin (CHLORASEPTIC MAX SORE THROAT) 1.5-33 % LIQD; Take 2 sprays by mouth every 2 hours as needed for sore throat  Dispense: 30 mL; Refill: 0  - Benzocaine 10 MG LOZG; Take 1 lozenge by mouth every 2 hours as needed For throat pain  Dispense: 24 lozenge; Refill: 3    3. Tension headache  - acetaminophen (TYLENOL) 500 MG tablet; Take 2 tablets (1,000 mg) by mouth every 8 hours as needed for mild pain  Dispense: 100 tablet; Refill: 0    4. Sinus pressure      5. Benign essential hypertension  - hydrochlorothiazide (HYDRODIURIL) 25 MG tablet; Take 1 tablet (25 mg) by mouth daily  Dispense: 30 tablet; Refill: 6  - aspirin 81 MG chewable tablet; Take 1 tablet (81 mg) by mouth daily  Dispense: 90 tablet; Refill: 3      Please call or return to clinic if your symptoms don't go away.    Follow up plan  Please make a clinic appointment for follow up with me (LINDSEY HOLLEY) as needed  Thank you for coming to [facility] today.  Lab Testing:  **If you had lab testing today and your results are reassuring or normal they will be mailed to you or sent through [employer] within 7 days.   **If the lab tests need quick action we will call you with the results.  The phone number we will call with results is # [phone] (home) . If this is not the best number please call our clinic and change the number.  Medication Refills:  If you need any refills please call your pharmacy and they will contact us.   If you need to  your refill at a new pharmacy, please contact the new pharmacy directly. The new  pharmacy will help you get your medications transferred faster.   Scheduling:  If you have any concerns about today's visit or wish to schedule another appointment please call our office during normal business hours [phone] (8-5:00 M-F)  If a referral was made to a [facility] and you don't get a call from central scheduling please call [phone].  If a Mammogram was ordered for you at [facility] call [phone] to schedule or change your appointment.  If you had an XRay/CT/Ultrasound/MRI ordered the number is [phone] to schedule or change your radiology appointment.   Medical Concerns:  If you have urgent medical concerns please call [phone] at any time of the day.

## 2017-07-18 NOTE — PROGRESS NOTES
"      HPI:       Juanita Salgado is a 42 year old who presents for the following  Patient presents with:  Pharyngitis  Headache      Acute Illness   Concerns: Sore throat, headache, sinus pressure  When did it start? 2-3 days  Is it getting better, worse or staying the same? unchanged    Fever?: No     Chills/Sweats?: No     Headache (location?) YES Bitemporal, x 2 days, improved with ibuprofen    Sinus Pressure?: YES     Conjunctivitis?:  YES itchiness, pain behind eyes, redness    Ear Pain?: No     Runny nose?: No     Congestion?: No     Sore Throat?:  YES worse in the morning     Cough?:  YES-productive of yellow sputum    Wheeze?: No     Decreased Appetite?: No     Nausea?:No     Vomiting?: No     Diarrhea?:  No     Dysuria/Frequency?.:No     Fatigue/Achiness?: YES all over    Sick/Strep Exposure?:  YES son is sick with strep throat on antibiotics for 4 days     Therapies Tried and outcome: Ibuprofen helped headache, but \"too strong for me, makes me dizzy\"       Problem, Medication and Allergy Lists were reviewed and are current.  Patient is an established patient of this clinic.         Review of Systems:   Review of Systems   Constitutional: Positive for activity change and fatigue (with generalized achiness). Negative for appetite change, chills, fever and unexpected weight change.   HENT: Positive for sinus pressure and sore throat. Negative for congestion and rhinorrhea.    Respiratory: Positive for cough. Negative for shortness of breath.    Cardiovascular: Negative for chest pain.   Gastrointestinal: Negative for abdominal pain, constipation, diarrhea, nausea and vomiting.   Genitourinary: Negative for dysuria.   Musculoskeletal: Negative for arthralgias and myalgias.   Neurological: Positive for dizziness and headaches. Negative for light-headedness.             Physical Exam:     Patient Vitals for the past 24 hrs:   BP Temp Pulse Resp SpO2 Height Weight   07/18/17 0902 130/80 - - - - - -   07/18/17 0901 " "(!) 131/92 98.6  F (37  C) 80 18 100 % 5' 8\" (172.7 cm) 210 lb 12.8 oz (95.6 kg)     Body mass index is 32.05 kg/(m^2).  Vitals were reviewed and were normal     Physical Exam   Constitutional: She is oriented to person, place, and time. She appears well-developed and well-nourished. No distress.   HENT:   Right Ear: External ear normal.   Left Ear: External ear normal.   Mouth/Throat: No oropharyngeal exudate.   Hyperemia posterior oropharynx without exudate or tonsillary hypertrophy/enlargement   Eyes: Conjunctivae are normal. Pupils are equal, round, and reactive to light.   Neck: Neck supple.   Cardiovascular: Normal rate, regular rhythm and normal heart sounds.    No murmur heard.  Pulmonary/Chest: Effort normal and breath sounds normal. No respiratory distress. She has no wheezes.   Lymphadenopathy:     She has cervical adenopathy (bilateral, tender with palpable lymphadenopathy anterior cervical lymph nodes).   Neurological: She is alert and oriented to person, place, and time.   Skin: Skin is warm and dry.   Psychiatric: She has a normal mood and affect.         Results:      Results from the last 24 hours  Results for orders placed or performed in visit on 07/18/17 (from the past 24 hour(s))   Strep Screen Rapid (Group) ([facility])   Result Value Ref Range    Rapid Strep A Screen NEGATIVE Negative     Assessment and Plan     1. Viral URI  - acetaminophen (TYLENOL) 500 MG tablet; Take 2 tablets (1,000 mg) by mouth every 8 hours as needed for mild pain  Dispense: 100 tablet; Refill: 0    2. Throat pain  - Strep Screen Rapid (Group) ([facility])  - acetaminophen (TYLENOL) 500 MG tablet; Take 2 tablets (1,000 mg) by mouth every 8 hours as needed for mild pain  Dispense: 100 tablet; Refill: 0  - Phenol-Glycerin (CHLORASEPTIC MAX SORE THROAT) 1.5-33 % LIQD; Take 2 sprays by mouth every 2 hours as needed for sore throat  Dispense: 30 mL; Refill: 0  - Benzocaine 10 MG LOZG; Take 1 lozenge by mouth every 2 hours as " needed For throat pain  Dispense: 24 lozenge; Refill: 3    3. Tension headache  - acetaminophen (TYLENOL) 500 MG tablet; Take 2 tablets (1,000 mg) by mouth every 8 hours as needed for mild pain  Dispense: 100 tablet; Refill: 0    4. Sinus pressure      5. Benign essential hypertension  - hydrochlorothiazide (HYDRODIURIL) 25 MG tablet; Take 1 tablet (25 mg) by mouth daily  Dispense: 30 tablet; Refill: 6  - aspirin 81 MG chewable tablet; Take 1 tablet (81 mg) by mouth daily  Dispense: 90 tablet; Refill: 3      There are no discontinued medications.  Options for treatment and follow-up care were reviewed with the patient. Juanita Salgado  engaged in the decision making process and verbalized understanding of the options discussed and agreed with the final plan.    Damir Doe MD

## 2017-07-20 LAB
BACTERIA SPEC CULT: NORMAL
Lab: NORMAL
MICRO REPORT STATUS: NORMAL
SPECIMEN SOURCE: NORMAL

## 2017-08-09 ENCOUNTER — OFFICE VISIT (OUTPATIENT)
Dept: FAMILY MEDICINE | Facility: CLINIC | Age: 42
End: 2017-08-09

## 2017-08-09 VITALS
WEIGHT: 214 LBS | RESPIRATION RATE: 18 BRPM | OXYGEN SATURATION: 99 % | BODY MASS INDEX: 32.54 KG/M2 | TEMPERATURE: 98.9 F | SYSTOLIC BLOOD PRESSURE: 131 MMHG | DIASTOLIC BLOOD PRESSURE: 86 MMHG | HEART RATE: 85 BPM

## 2017-08-09 DIAGNOSIS — B35.9 RINGWORM: Primary | ICD-10-CM

## 2017-08-09 RX ORDER — KETOCONAZOLE 20 MG/G
CREAM TOPICAL 2 TIMES DAILY
Qty: 15 G | Refills: 1 | Status: SHIPPED | OUTPATIENT
Start: 2017-08-09 | End: 2018-04-24

## 2017-08-09 ASSESSMENT — ENCOUNTER SYMPTOMS
COLOR CHANGE: 1
CONSTITUTIONAL NEGATIVE: 1

## 2017-08-09 NOTE — MR AVS SNAPSHOT
After Visit Summary   2017    Juanita Salgado    MRN: [id number]           Patient Information     Date Of Birth          1975        Visit Information        Provider Department      2017 9:40 AM Lynette Hand MD [facility]        Today's Diagnoses     Ringworm    -  1       Follow-ups after your visit        Who to contact     Please call your clinic at [phone] to:    Ask questions about your health    Make or cancel appointments    Discuss your medicines    Learn about your test results    Speak to your doctor   If you have compliments or concerns about an experience at your clinic, or if you wish to file a complaint, please contact [facility] Patient Relations at [phone] or email us at [email or web address]         Additional Information About Your Visit        MyChart Information     Testive is an electronic gateway that provides easy, online access to your medical records. With Testive, you can request a clinic appointment, read your test results, renew a prescription or communicate with your care team.     To sign up for [employer] visit the website at [email or web address]   You will be asked to enter the access code listed below, as well as some personal information. Please follow the directions to create your username and password.     Your access code is: [email or web address]  Expires: 10/16/2017  9:38 AM     Your access code will  in 90 days. If you need help or a new code, please contact your [facility] or call [phone] for assistance.        Care EveryWhere ID     This is your Care EveryWhere ID. This could be used by other organizations to access your [facility] medical records  [id number]        Your Vitals Were     Pulse Temperature Respirations Last Period Pulse Oximetry Breastfeeding?    85 98.9  F (37.2  C) (Oral) 18 2017 (Within Days) 99% No    BMI (Body Mass  Index)                   32.54 kg/m2            Blood Pressure from Last 3 Encounters:   08/09/17 131/86   07/18/17 130/80   05/24/17 136/85    Weight from Last 3 Encounters:   08/09/17 214 lb (97.1 kg)   07/18/17 210 lb 12.8 oz (95.6 kg)   05/24/17 217 lb 6.4 oz (98.6 kg)              Today, you had the following     No orders found for display         Today's Medication Changes          These changes are accurate as of: 8/9/17 11:59 PM.  If you have any questions, ask your nurse or doctor.               Start taking these medicines.        Dose/Directions    ketoconazole 2 % cream   Commonly known as:  NIZORAL   Used for:  Ringworm   Started by:  Lynette Hand MD        Apply topically 2 times daily   Quantity:  15 g   Refills:  1            Where to get your medicines      These medications were sent to [facility] [place], [place] - [address]   [address] [place] [id number]     Phone:  [phone]     ketoconazole 2 % cream                Primary Care Provider Office Phone # Fax #    Aminata Tamie Molina [phone]       [facility] [address] [facility] [address]  [facility] [address]        Equal Access to Services     LINWOOD SHOOK AH: Barbara andradeo Soisaiasali, waaxda luqadaha, qaybta kaalmada adeegyada, vicente motley. So [facility] [phone].    ATENCIÓN: Si habla español, tiene a cote disposición servicios gratuitos de asistencia lingüística. Llame al [phone].    We comply with applicable federal civil rights laws and [place] laws. We do not discriminate on the basis of race, color, national origin, age, disability sex, sexual orientation or gender identity.            Thank you!     Thank you for choosing \[facility]\"" [facility]  for your care. Our goal is always to provide you with excellent care. Hearing back from our patients is one way we can continue to improve our services. Please take a few minutes to complete  the written survey that you may receive in the mail after your visit with us. Thank you!             Your Updated Medication List - Protect others around you: Learn how to safely use, store and throw away your medicines at www.disposemymeds.org.          This list is accurate as of: 8/9/17 11:59 PM.  Always use your most recent med list.                   Brand Name Dispense Instructions for use Diagnosis    acetaminophen 500 MG tablet    TYLENOL    100 tablet    Take 2 tablets (1,000 mg) by mouth every 8 hours as needed for mild pain    Viral URI, Throat pain, Tension headache       Adult Blood Pressure Cuff Lg Kit     1 kit    1 Device as needed    Benign essential hypertension       aspirin 81 MG chewable tablet     90 tablet    Take 1 tablet (81 mg) by mouth daily    Benign essential hypertension       hydrochlorothiazide 25 MG tablet    HYDRODIURIL    30 tablet    Take 1 tablet (25 mg) by mouth daily    Benign essential hypertension       ketoconazole 2 % cream    NIZORAL    15 g    Apply topically 2 times daily    Ringworm

## 2017-08-09 NOTE — PROGRESS NOTES
Preceptor Attestation:   Patient seen and discussed with the resident. Assessment and plan reviewed with resident and agreed upon.   Supervising Physician:  Shakira Saucedo MD  [facility]

## 2017-08-09 NOTE — PROGRESS NOTES
HPI:       Juanita Salgado is a 42 year old who presents for the following  Patient presents with:  Derm Problem      Rash/Lesion  Onset: 4 YEARS    Description:   Location: right hand, both legs and arms  Color: red  Character: round, red  Itching (Pruritis): Yes Details: severe and all the time    Pain?:no    Progression of Symptoms:  same    Accompanying Signs & Symptoms:  Fever: no  Body aches or joint pain:  no  Sore throat symptoms:no  Recent cold symptoms: no    History:   Previous similar rash: Yes Details: been treated with topical antifungal and responded well to treatment      Precipitating factors:   Exposure to similar rash: Yes Details: 3 kids have similar problem    New exposures: {no  Recent travel: no  New Medication: no    What makes it better?:  Treatment with antifungal medications  Therapies Tried and outcome:  Antifungal topical cream    A [employer]  was used for  this visit.      Problem, Medication and Allergy Lists were reviewed and are current.  Patient is an established patient of this clinic.         Review of Systems:   Review of Systems   Constitutional: Negative.    HENT: Negative.    Skin: Positive for color change and rash.             Physical Exam:   Patient Vitals for the past 24 hrs:   BP Temp Temp src Pulse Resp SpO2 Weight   08/09/17 0938 131/86 98.9  F (37.2  C) Oral 85 18 99 % 214 lb (97.1 kg)     Body mass index is 32.54 kg/(m^2).  Vitals were reviewed and were normal     Physical Exam   Constitutional: She appears well-developed and well-nourished.   Cardiovascular: Normal rate and regular rhythm.    Pulmonary/Chest: Effort normal and breath sounds normal.   Skin:              Results:       Assessment and Plan     1. Ringworm  Patient was advised to make an appointment and bring all her children to the clinic to treat everyone, this way the contagious ringworm can be contained. She has 5 children and 3 of them have similar symptoms, one also has it in the scalp.  Patient will bring her children tomorrow to the clinic for treatment and assessment.     - ketoconazole (NIZORAL) 2 % cream; Apply topically 2 times daily  Dispense: 15 g; Refill: 1  There are no discontinued medications.  Options for treatment and follow-up care were reviewed with the patient. Juanita Salgado  engaged in the decision making process and verbalized understanding of the options discussed and agreed with the final plan.    Lynette Hand MD

## 2018-04-17 ENCOUNTER — OFFICE VISIT (OUTPATIENT)
Dept: FAMILY MEDICINE | Facility: CLINIC | Age: 43
End: 2018-04-17
Payer: COMMERCIAL

## 2018-04-17 VITALS
DIASTOLIC BLOOD PRESSURE: 83 MMHG | HEART RATE: 58 BPM | SYSTOLIC BLOOD PRESSURE: 136 MMHG | WEIGHT: 224.6 LBS | TEMPERATURE: 98.4 F | BODY MASS INDEX: 34.15 KG/M2 | OXYGEN SATURATION: 97 %

## 2018-04-17 DIAGNOSIS — I67.1 CEREBRAL ANEURYSM, NONRUPTURED: ICD-10-CM

## 2018-04-17 DIAGNOSIS — M54.2 CERVICALGIA: ICD-10-CM

## 2018-04-17 DIAGNOSIS — E78.5 HYPERLIPIDEMIA, UNSPECIFIED HYPERLIPIDEMIA TYPE: ICD-10-CM

## 2018-04-17 DIAGNOSIS — Z00.00 HEALTHCARE MAINTENANCE: ICD-10-CM

## 2018-04-17 DIAGNOSIS — I10 BENIGN ESSENTIAL HYPERTENSION: ICD-10-CM

## 2018-04-17 DIAGNOSIS — E55.9 VITAMIN D DEFICIENCY: ICD-10-CM

## 2018-04-17 DIAGNOSIS — R52 BODY ACHES: Primary | ICD-10-CM

## 2018-04-17 DIAGNOSIS — R73.03 PREDIABETES: ICD-10-CM

## 2018-04-17 DIAGNOSIS — R68.89 TEMPERATURE INTOLERANCE: ICD-10-CM

## 2018-04-17 LAB
BUN SERPL-MCNC: 15.4 MG/DL (ref 7–19)
CALCIUM SERPL-MCNC: 8.8 MG/DL (ref 8.5–10.1)
CHLORIDE SERPLBLD-SCNC: 104.5 MMOL/L (ref 98–110)
CO2 SERPL-SCNC: 27 MMOL/L (ref 20–32)
CREAT SERPL-MCNC: 0.6 MG/DL (ref 0.5–1)
DEPRECATED CALCIDIOL+CALCIFEROL SERPL-MC: 14 UG/L (ref 20–75)
GFR SERPL CREATININE-BSD FRML MDRD: >90 ML/MIN/1.7 M2
GLUCOSE SERPL-MCNC: 92.7 MG'DL (ref 70–99)
HBA1C MFR BLD: 5.9 % (ref 4.1–5.7)
HCT VFR BLD AUTO: 38.3 % (ref 35–47)
HEMOGLOBIN: 11.7 G/DL (ref 11.7–15.7)
MCH RBC QN AUTO: 26 PG (ref 26.5–35)
MCHC RBC AUTO-ENTMCNC: 30.5 G/DL (ref 32–36)
MCV RBC AUTO: 85.2 FL (ref 78–100)
PLATELET # BLD AUTO: 128 K/UL (ref 150–450)
POTASSIUM SERPL-SCNC: 3.6 MMOL/DL (ref 3.3–4.5)
RBC # BLD AUTO: 4.5 M/UL (ref 3.8–5.2)
SODIUM SERPL-SCNC: 136.2 MMOL/L (ref 132.6–141.4)
TSH SERPL DL<=0.005 MIU/L-ACNC: 1.65 MU/L (ref 0.4–4)
WBC # BLD AUTO: 4.9 K/UL (ref 4–11)

## 2018-04-17 RX ORDER — HYDROCHLOROTHIAZIDE 25 MG/1
25 TABLET ORAL DAILY
Qty: 30 TABLET | Refills: 11 | Status: SHIPPED | OUTPATIENT
Start: 2018-04-17 | End: 2018-08-22

## 2018-04-17 RX ORDER — ASPIRIN 81 MG/1
81 TABLET, CHEWABLE ORAL DAILY
Qty: 90 TABLET | Refills: 3 | Status: SHIPPED | OUTPATIENT
Start: 2018-04-17 | End: 2018-09-11

## 2018-04-17 ASSESSMENT — ENCOUNTER SYMPTOMS
POLYPHAGIA: 0
DIZZINESS: 0
WEAKNESS: 0
DIARRHEA: 0
POLYDIPSIA: 0
ROS GI COMMENTS: EPIGASTRIC PAIN
DYSURIA: 0
FEVER: 1
RHINORRHEA: 0
SHORTNESS OF BREATH: 0
CONSTIPATION: 0
NAUSEA: 0
SORE THROAT: 0
ACTIVITY CHANGE: 1
MYALGIAS: 1
PHOTOPHOBIA: 1
VOMITING: 0
APPETITE CHANGE: 0
HEADACHES: 1

## 2018-04-17 NOTE — PROGRESS NOTES
HPI:       Juanita Salgado is a 43 year old who presents for the following  Patient presents with:  Generalized Body Aches: x 3 days no improvement with medication and seem to be getting worse      Body aches:  Started with feeling hot x3 days and continuous headache. Has been opening windows and shut off heat. History of chronic headaches but they have been good for the most part the past year. No longer taking the amitriptyline for this. Decreased energy, appetite ok. Headache feels similar, +photophobia (unchanged from previous headaches). No one else at home ill, no other exposures. Denies sore throat, congestion, n/v/d, constipation, dysuria. Tylenol has not helped.     Currently menstruating. Occurs monthly. Denies changes in bowel habits, weight, skin/hair/nail changes.     She is also requesting a PT referral for neck pain and a BP machine.       A [employer]  was used for  this visit.      Problem, Medication and Allergy Lists were reviewed and are current.  Patient is an established patient of this clinic.         Review of Systems:   Review of Systems   Constitutional: Positive for activity change and fever (tactile). Negative for appetite change.   HENT: Negative for congestion, rhinorrhea and sore throat.    Eyes: Positive for photophobia (with headaches only) and visual disturbance.   Respiratory: Negative for shortness of breath.    Cardiovascular: Negative for chest pain.   Gastrointestinal: Negative for constipation, diarrhea, nausea and vomiting.        Epigastric pain   Endocrine: Positive for heat intolerance. Negative for polydipsia, polyphagia and polyuria.   Genitourinary: Negative for dysuria.   Musculoskeletal: Positive for myalgias.   Neurological: Positive for headaches (See HPI). Negative for dizziness and weakness.             Physical Exam:   Patient Vitals for the past 24 hrs:   BP Temp Temp src Pulse SpO2 Weight   04/17/18 1100 136/83 - - - - -   04/17/18 1057 133/90 98.4  F  (36.9  C) Oral 58 97 % 224 lb 9.6 oz (101.9 kg)     Body mass index is 34.15 kg/(m^2).  Vitals were reviewed and were normal     Physical Exam   Constitutional: She is oriented to person, place, and time. She appears well-developed and well-nourished. No distress.   Neck: Neck supple. No thyromegaly present.   Cardiovascular: Normal rate, regular rhythm and normal heart sounds.  Exam reveals no gallop and no friction rub.    No murmur heard.  Pulmonary/Chest: Effort normal and breath sounds normal. No respiratory distress. She has no wheezes. She has no rales.   Abdominal: Soft. Bowel sounds are normal. She exhibits no distension and no mass. There is no tenderness. There is no rebound and no guarding.   Lymphadenopathy:     She has no cervical adenopathy.   Neurological: She is alert and oriented to person, place, and time.   Skin: Skin is warm and dry.   Psychiatric: She has a normal mood and affect.         Results:     Results for orders placed or performed in visit on 04/17/18   Vitamin D Level   Result Value Ref Range    Vitamin D Deficiency screening 14 (L) 20 - 75 ug/L   CBC with Plt ([facility])   Result Value Ref Range    WBC 4.9 4.0 - 11.0 K/uL    RBC 4.50 3.80 - 5.20 M/uL    Hemoglobin 11.7 11.7 - 15.7 g/dL    Hematocrit 38.3 35.0 - 47.0 %    MCV 85.2 78.0 - 100.0 fL    MCH 26.0 (L) 26.5 - 35.0 pg    MCHC 30.5 (L) 32.0 - 36.0 g/dL    Platelets 128.0 (L) 150.0 - 450.0 K/uL   TSH with free T4 reflex   Result Value Ref Range    TSH 1.65 0.40 - 4.00 mU/L   Basic Metabolic Panel ([facility])   Result Value Ref Range    Urea Nitrogen 15.4 7.0 - 19.0 mg/dL    Calcium 8.8 8.5 - 10.1 mg/dL    Chloride 104.5 98.0 - 110.0 mmol/L    Carbon Dioxide 27.0 20.0 - 32.0 mmol/L    Creatinine 0.6 0.5 - 1.0 mg/dL    Glucose 92.7 70.0 - 99.0 mg'dL    Potassium 3.6 3.3 - 4.5 mmol/dL    Sodium 136.2 132.6 - 141.4 mmol/L    GFR Estimate >90 >60.0 mL/min/1.7 m2    GFR Estimate If Black >90 >60.0 mL/min/1.7 m2   Hemoglobin A1c  ([facility])   Result Value Ref Range    Hemoglobin A1C 5.9 (H) 4.1 - 5.7 %         Assessment and Plan     Juanita was seen today for generalized body aches.    Diagnoses and all orders for this visit:    Body aches  -     Vitamin D Level  -     CBC with Plt ([facility])    Cervicalgia  -     PHYSICAL THERAPY REFERRAL - EXTERNAL    Healthcare maintenance  -     order for DME; Equipment being ordered: blood pressure machine    Benign essential hypertension  -     Basic Metabolic Panel ([facility])  -     hydrochlorothiazide (HYDRODIURIL) 25 MG tablet; Take 1 tablet (25 mg) by mouth daily  -     aspirin 81 MG chewable tablet; Take 1 tablet (81 mg) by mouth daily    Temperature intolerance  -     TSH with free T4 reflex    Prediabetes  -     Hemoglobin A1c ([facility])    Hyperlipidemia, unspecified hyperlipidemia type  -     Lipid panel reflex to direct LDL Fasting; Future    Cerebral aneurysm, nonruptured  -     NEUROLOGY ADULT REFERRAL - INTERNAL  -     MRA Angiogram Brain and Neck; Future    Follow-up in 1-2 weeks to review lab results, sooner as needed.     Options for treatment and follow-up care were reviewed with the patient. Juanita Salgado  engaged in the decision making process and verbalized understanding of the options discussed and agreed with the final plan.    Geni Carmen RN, DNP-FNP student [facility]    History and physical examination done with student nurse practitioner.  Student acted as scribe for this encounter.  I agree with assessment and plan for this patient.     Jailene Manuel, BETTY CNP

## 2018-04-17 NOTE — PATIENT INSTRUCTIONS
Here is the plan from today's visit    1. Body aches  - Vitamin D Level  - CBC with Plt ([facility])    2. Cervicalgia  - PHYSICAL THERAPY REFERRAL - EXTERNAL    3. Healthcare maintenance  - order for DME; Equipment being ordered: blood pressure machine  Dispense: 1 Units; Refill: 0    4. Benign essential hypertension  - Basic Metabolic Panel ([facility])    5. Temperature intolerance  - TSH with free T4 reflex    6. Prediabetes  - Hemoglobin A1c ([facility])    7. Hyperlipidemia, unspecified hyperlipidemia type  - Lipid panel reflex to direct LDL Fasting; Future    8. Cerebral aneurysm, nonruptured  - NEUROLOGY ADULT REFERRAL - INTERNAL      Follow-up for lab only appointment (come fasting) prior to follow-up appointment in 1-2 weeks to review lab results.       Thank you for coming to [facility] today.  Lab Testing:  **If you had lab testing today and your results are reassuring or normal they will be mailed to you or sent through [employer] within 7 days.   **If the lab tests need quick action we will call you with the results.  The phone number we will call with results is # [phone] (home) . If this is not the best number please call our clinic and change the number.  Medication Refills:  If you need any refills please call your pharmacy and they will contact us.   If you need to  your refill at a new pharmacy, please contact the new pharmacy directly. The new pharmacy will help you get your medications transferred faster.   Scheduling:  If you have any concerns about today's visit or wish to schedule another appointment please call our office during normal business hours [phone] (8-5:00 M-F)  If a referral was made to a [facility] and you don't get a call from central scheduling please call [phone].  If a Mammogram was ordered for you at [facility] call [phone] to schedule or change your appointment.  If you had an XRay/CT/Ultrasound/MRI ordered the number  is [phone] to schedule or change your radiology appointment.   Medical Concerns:  If you have urgent medical concerns please call [phone] at any time of the day.  If you have a medical emergency please call [phone].

## 2018-04-17 NOTE — LETTER
RETURN TO WORK/SCHOOL FORM    4/17/2018    Re: Juanita Salgado  1975      To Whom It May Concern:     Juanita Salgado was seen in clinic today. She may return to school without restrictions on 4/17/18.          Restrictions:  None      BETTY Tenorio CNP

## 2018-04-17 NOTE — LETTER
April 19, 2018      Juanita Salgado  [address]  [place] [address]        Dear Juanita,    Thank you for getting your care at [facility]. Please see below for your test results.    Your Vitamin D level was low at a level of 14.  I sent a prescription into [facility] pharmacy for a Vitamin D supplement that you will take once weekly (every 7 days) for 16 weeks.  After completing the 16 week course, I recommend a daily Vitamin D supplement.      Resulted Orders   Vitamin D Level   Result Value Ref Range    Vitamin D Deficiency screening 14 (L) 20 - 75 ug/L      Comment:      Season, race, dietary intake, and treatment affect the concentration of   25-hydroxy-Vitamin D. Values may decrease during winter months and increase   during summer months. Values 20-29 ug/L may indicate Vitamin D insufficiency   and values <20 ug/L may indicate Vitamin D deficiency.  Vitamin D determination is routinely performed by an immunoassay specific for   25 hydroxyvitamin D3.  If an individual is on vitamin D2 (ergocalciferol)   supplementation, please specify 25 OH vitamin D2 and D3 level determination by   LCMSMS test VITD23.     CBC with Plt ([facility])   Result Value Ref Range    WBC 4.9 4.0 - 11.0 K/uL    RBC 4.50 3.80 - 5.20 M/uL    Hemoglobin 11.7 11.7 - 15.7 g/dL    Hematocrit 38.3 35.0 - 47.0 %    MCV 85.2 78.0 - 100.0 fL    MCH 26.0 (L) 26.5 - 35.0 pg    MCHC 30.5 (L) 32.0 - 36.0 g/dL    Platelets 128.0 (L) 150.0 - 450.0 K/uL   TSH with free T4 reflex   Result Value Ref Range    TSH 1.65 0.40 - 4.00 mU/L   Basic Metabolic Panel ([facility])   Result Value Ref Range    Urea Nitrogen 15.4 7.0 - 19.0 mg/dL    Calcium 8.8 8.5 - 10.1 mg/dL    Chloride 104.5 98.0 - 110.0 mmol/L    Carbon Dioxide 27.0 20.0 - 32.0 mmol/L    Creatinine 0.6 0.5 - 1.0 mg/dL    Glucose 92.7 70.0 - 99.0 mg'dL    Potassium 3.6 3.3 - 4.5 mmol/dL    Sodium 136.2 132.6 - 141.4 mmol/L    GFR Estimate >90 >60.0 mL/min/1.7 m2    GFR Estimate If Black >90  >60.0 mL/min/1.7 m2   Hemoglobin A1c ([facility])   Result Value Ref Range    Hemoglobin A1C 5.9 (H) 4.1 - 5.7 %       If you have any concerns about these results please call and leave a message for me or send a [employer] message to the clinic.    Sincerely,    BETTY Epperson CNP

## 2018-04-17 NOTE — NURSING NOTE
name: Sonam Dye  Language: Kenyan  Agency: [employer]  Phone number: [phone]  Mirna Hernandez CMA

## 2018-04-17 NOTE — MR AVS SNAPSHOT
After Visit Summary   4/17/2018    Juanita Salgado    MRN: [id number]           Patient Information     Date Of Birth          1975        Visit Information        Provider Department      4/17/2018 11:00 AM Jailene Manuel APRN CNP [facility]        Today's Diagnoses     Body aches    -  1    Cervicalgia        Healthcare maintenance        Benign essential hypertension        Temperature intolerance        Prediabetes        Hyperlipidemia, unspecified hyperlipidemia type        Cerebral aneurysm, nonruptured          Care Instructions    Here is the plan from today's visit    1. Body aches  - Vitamin D Level  - CBC with Plt ([facility])    2. Cervicalgia  - PHYSICAL THERAPY REFERRAL - EXTERNAL    3. Healthcare maintenance  - order for DME; Equipment being ordered: blood pressure machine  Dispense: 1 Units; Refill: 0    4. Benign essential hypertension  - Basic Metabolic Panel ([facility])    5. Temperature intolerance  - TSH with free T4 reflex    6. Prediabetes  - Hemoglobin A1c ([facility])    7. Hyperlipidemia, unspecified hyperlipidemia type  - Lipid panel reflex to direct LDL Fasting; Future    8. Cerebral aneurysm, nonruptured  - NEUROLOGY ADULT REFERRAL - INTERNAL      Follow-up for lab only appointment (come fasting) prior to follow-up appointment in 1-2 weeks to review lab results.       Thank you for coming to [facility] today.  Lab Testing:  **If you had lab testing today and your results are reassuring or normal they will be mailed to you or sent through [employer] within 7 days.   **If the lab tests need quick action we will call you with the results.  The phone number we will call with results is # [phone] (home) . If this is not the best number please call our clinic and change the number.  Medication Refills:  If you need any refills please call your pharmacy and they will contact us.   If you need to  your refill at a new pharmacy, please contact  the new pharmacy directly. The new pharmacy will help you get your medications transferred faster.   Scheduling:  If you have any concerns about today's visit or wish to schedule another appointment please call our office during normal business hours [phone] (8-5:00 M-F)  If a referral was made to a [facility] and you don't get a call from central scheduling please call [phone].  If a Mammogram was ordered for you at [facility] call [phone] to schedule or change your appointment.  If you had an XRay/CT/Ultrasound/MRI ordered the number is [phone] to schedule or change your radiology appointment.   Medical Concerns:  If you have urgent medical concerns please call [phone] at any time of the day.  If you have a medical emergency please call [phone].            Follow-ups after your visit        Additional Services     NEUROLOGY ADULT REFERRAL - INTERNAL       Your provider has referred you for the following:   Consult at [facility]: [facility] [place] [phone]   [email or web address]  Cerebral Aneurysm    Please be aware that coverage of these services is subject to the terms and limitations of your health insurance plan.  Call member services at your health plan with any benefit or coverage questions.      Please bring the following with you to your appointment:    (1) Any X-Rays, CTs or MRIs which have been performed.  Contact the facility where they were done to arrange for  prior to your scheduled appointment.    (2) List of current medications  (3) This referral request   (4) Any documents/labs given to you for this referral            PHYSICAL THERAPY REFERRAL - EXTERNAL       [facility]   [facility] [place] [address]  [phone]                  Future tests that were ordered for you today     Open Future Orders        Priority Expected Expires Ordered    Lipid panel reflex to direct LDL Fasting Routine   2019            Who to contact     Please call your clinic at [phone] to:    Ask questions about your health    Make or cancel appointments    Discuss your medicines    Learn about your test results    Speak to your doctor            Additional Information About Your Visit        MyChart Information     [employer] is an electronic gateway that provides easy, online access to your medical records. With [employer], you can request a clinic appointment, read your test results, renew a prescription or communicate with your care team.     To sign up for [employer] visit the website at [email or web address]   You will be asked to enter the access code listed below, as well as some personal information. Please follow the directions to create your username and password.     Your access code is: [email or web address]  Expires: 2018 12:05 PM     Your access code will  in 90 days. If you need help or a new code, please contact your [facility] or call [phone] for assistance.        Care EveryWhere ID     This is your Care EveryWhere ID. This could be used by other organizations to access your [place] medical records  [id number]        Your Vitals Were     Pulse Temperature Pulse Oximetry BMI (Body Mass Index)          58 98.4  F (36.9  C) (Oral) 97% 34.15 kg/m2         Blood Pressure from Last 3 Encounters:   18 136/83   17 131/86   17 130/80    Weight from Last 3 Encounters:   18 224 lb 9.6 oz (101.9 kg)   17 214 lb (97.1 kg)   17 210 lb 12.8 oz (95.6 kg)              We Performed the Following     Basic Metabolic Panel ([facility])     CBC with Plt ([facility])     Hemoglobin A1c ([facility])     NEUROLOGY ADULT REFERRAL - INTERNAL     PHYSICAL THERAPY REFERRAL - EXTERNAL     TSH with free T4 reflex     Vitamin D Level          Today's Medication Changes          These changes are accurate as of 18 12:14 PM.  If you have any  questions, ask your nurse or doctor.               Start taking these medicines.        Dose/Directions    order for DME   Used for:  Healthcare maintenance   Started by:  Jailene Manuel APRN CNP        Equipment being ordered: blood pressure machine   Quantity:  1 Units   Refills:  0            Where to get your medicines      Some of these will need a paper prescription and others can be bought over the counter.  Ask your nurse if you have questions.     Bring a paper prescription for each of these medications     order for DME                Primary Care Provider Fax #    Physician No Ref-Primary [phone]       No address on file        Equal Access to Services     ALCON [facility] : Hadii susy ku hadasho Soomaali, waaxda luqadaha, qaybta kaalmada adecorinyayumiko, vicente kevin . So [facility] [phone].    ATENCIÓN: Si habla español, tiene a cote disposición servicios gratuitos de asistencia lingüística. Llame al [phone].    We comply with applicable federal civil rights laws and [place] laws. We do not discriminate on the basis of race, color, national origin, age, disability, sex, sexual orientation, or gender identity.            Thank you!     Thank you for choosing [facility]  for your care. Our goal is always to provide you with excellent care. Hearing back from our patients is one way we can continue to improve our services. Please take a few minutes to complete the written survey that you may receive in the mail after your visit with us. Thank you!             Your Updated Medication List - Protect others around you: Learn how to safely use, store and throw away your medicines at www.disposemymeds.org.          This list is accurate as of 4/17/18 12:14 PM.  Always use your most recent med list.                   Brand Name Dispense Instructions for use Diagnosis    acetaminophen 500 MG tablet    TYLENOL    100 tablet    Take 2 tablets (1,000 mg) by mouth  every 8 hours as needed for mild pain    Viral URI, Throat pain, Tension headache       Adult Blood Pressure Cuff Lg Kit     1 kit    1 Device as needed    Benign essential hypertension       aspirin 81 MG chewable tablet     90 tablet    Take 1 tablet (81 mg) by mouth daily    Benign essential hypertension       hydrochlorothiazide 25 MG tablet    HYDRODIURIL    30 tablet    Take 1 tablet (25 mg) by mouth daily    Benign essential hypertension       ketoconazole 2 % cream    NIZORAL    15 g    Apply topically 2 times daily    Ringworm       order for DME     1 Units    Equipment being ordered: blood pressure machine    Healthcare maintenance

## 2018-04-18 RX ORDER — ERGOCALCIFEROL 1.25 MG/1
50000 CAPSULE, LIQUID FILLED ORAL
Qty: 4 CAPSULE | Refills: 3 | Status: SHIPPED | OUTPATIENT
Start: 2018-04-18 | End: 2018-08-02

## 2018-04-20 DIAGNOSIS — E78.5 HYPERLIPIDEMIA, UNSPECIFIED HYPERLIPIDEMIA TYPE: ICD-10-CM

## 2018-04-20 LAB
CHOLEST SERPL-MCNC: 232 MG/DL
HDLC SERPL-MCNC: 31 MG/DL
LDLC SERPL CALC-MCNC: ABNORMAL MG/DL
LDLC SERPL DIRECT ASSAY-MCNC: 92 MG/DL
NONHDLC SERPL-MCNC: 200 MG/DL
TRIGL SERPL-MCNC: 488 MG/DL

## 2018-04-24 ENCOUNTER — OFFICE VISIT (OUTPATIENT)
Dept: FAMILY MEDICINE | Facility: CLINIC | Age: 43
End: 2018-04-24
Payer: COMMERCIAL

## 2018-04-24 VITALS
HEART RATE: 89 BPM | DIASTOLIC BLOOD PRESSURE: 79 MMHG | SYSTOLIC BLOOD PRESSURE: 119 MMHG | BODY MASS INDEX: 34.12 KG/M2 | OXYGEN SATURATION: 100 % | WEIGHT: 224.4 LBS | TEMPERATURE: 98.6 F

## 2018-04-24 DIAGNOSIS — R79.89 PLATELET COUNT LESS 140,000 PER CUBIC MILLIMETER: ICD-10-CM

## 2018-04-24 DIAGNOSIS — J30.2 SEASONAL ALLERGIC RHINITIS, UNSPECIFIED CHRONICITY, UNSPECIFIED TRIGGER: ICD-10-CM

## 2018-04-24 DIAGNOSIS — G44.89 OTHER HEADACHE SYNDROME: ICD-10-CM

## 2018-04-24 DIAGNOSIS — E78.5 HYPERLIPIDEMIA, UNSPECIFIED HYPERLIPIDEMIA TYPE: Primary | ICD-10-CM

## 2018-04-24 DIAGNOSIS — M54.2 CERVICALGIA: ICD-10-CM

## 2018-04-24 DIAGNOSIS — B35.4 TINEA CORPORIS: ICD-10-CM

## 2018-04-24 RX ORDER — ATORVASTATIN CALCIUM 20 MG/1
20 TABLET, FILM COATED ORAL DAILY
Qty: 30 TABLET | Refills: 11 | Status: SHIPPED | OUTPATIENT
Start: 2018-04-24 | End: 2018-09-11

## 2018-04-24 RX ORDER — IBUPROFEN 800 MG/1
800 TABLET, FILM COATED ORAL EVERY 8 HOURS PRN
Qty: 90 TABLET | Refills: 1 | Status: SHIPPED | OUTPATIENT
Start: 2018-04-24 | End: 2019-06-18

## 2018-04-24 RX ORDER — KETOCONAZOLE 20 MG/G
CREAM TOPICAL 2 TIMES DAILY
Qty: 30 G | Refills: 1 | Status: SHIPPED | OUTPATIENT
Start: 2018-04-24 | End: 2018-05-08

## 2018-04-24 RX ORDER — IBUPROFEN 600 MG/1
600 TABLET, FILM COATED ORAL EVERY 6 HOURS PRN
Qty: 60 TABLET | Refills: 3 | Status: SHIPPED | OUTPATIENT
Start: 2018-04-24 | End: 2018-04-24

## 2018-04-24 RX ORDER — FLUTICASONE PROPIONATE 50 MCG
1 SPRAY, SUSPENSION (ML) NASAL DAILY
Qty: 1 BOTTLE | Refills: 11 | Status: SHIPPED | OUTPATIENT
Start: 2018-04-24 | End: 2018-09-11

## 2018-04-24 ASSESSMENT — ENCOUNTER SYMPTOMS
COUGH: 0
HEADACHES: 1
NECK PAIN: 1
PALPITATIONS: 0
SHORTNESS OF BREATH: 0

## 2018-04-24 NOTE — PROGRESS NOTES
HPI:       Juanita Salgado is a 43 year old who presents for the following  Patient presents with:  RECHECK: Lab results  Headache: would like stronger medication for headaches      Concern: lab result follow-up, headache medication  Description of the problem :  Reviewed labs from previous visit. Feels reluctant to start a statin medication as she feels she already takes too many medications, but ultimately agrees to try. Started taking the vitamin D 50,000 units weekly on Friday.      Would like an allergy medication today (flonase) today. Has always had seasonal allergies.     Requesting something stronger for her headaches. Sometimes wakes with headache, otherwise starts slowly  bilateral frontal and becomes more diffuse and strong.  Has been occurring since 2016. Primarily occurs cyclically with periods and can last all week for the duration of her period. Periods are regular. Takes tylenol. Used to take ibuprofen 800 mg which helped for a few hours.     Rash starting on right forearm. Her children are taking an antifungal oral medication. She has been treated in the past with a cream.    A [employer]  was used for  this visit.      Problem, Medication and Allergy Lists were reviewed and are current.  Patient is an established patient of this clinic.         Review of Systems:   Review of Systems   Respiratory: Negative for cough and shortness of breath.    Cardiovascular: Negative for chest pain, palpitations and leg swelling.   Musculoskeletal: Positive for neck pain.   Skin: Positive for rash (See HPI).   Neurological: Positive for headaches (See HPI).             Physical Exam:   No data found.    There is no height or weight on file to calculate BMI.  Vitals were reviewed and were normal     Physical Exam   Constitutional: She is oriented to person, place, and time. She appears well-developed and well-nourished. No distress.   Cardiovascular: Normal rate, regular rhythm and normal heart sounds.   Exam reveals no gallop and no friction rub.    No murmur heard.  Pulmonary/Chest: Effort normal and breath sounds normal. No respiratory distress. She has no wheezes. She has no rales.   Musculoskeletal: She exhibits tenderness (bilateral trapezius, SCM).   Neurological: She is alert and oriented to person, place, and time. No cranial nerve deficit.   Skin: Rash (Approx 0.5 cm diameter erythematous raised rash with central clearing on medial upper right forearm) noted.   Psychiatric: She has a normal mood and affect.         Results:   None    Assessment and Plan     Juanita was seen today for recheck, headache and rash.    Diagnoses and all orders for this visit:    Hyperlipidemia, unspecified hyperlipidemia type  -     atorvastatin (LIPITOR) 20 MG tablet; Take 1 tablet (20 mg) by mouth daily- start taking  -     Comprehensive Metabolic Panel ([facility]); Future  -     Lipid panel reflex to direct LDL Fasting; Future    Seasonal allergic rhinitis, unspecified chronicity, unspecified trigger  -     fluticasone (FLONASE) 50 MCG/ACT spray; Spray 1 spray into both nostrils daily    Cervicalgia  -     PHYSICAL THERAPY REFERRAL - EXTERNAL (reordering to change to [facility])    Platelet count less 140,000 per cubic millimeter  -     CBC with Plt ([facility]); Future - recheck platelet count    Other headache syndrome  -     ibuprofen (ADVIL/MOTRIN) 800 MG tablet; Take 1 tablet (800 mg) by mouth every 8 hours as needed for moderate pain- take at onset of headache     Tinea corporis  -     ketoconazole (NIZORAL) 2 % cream; Apply topically 2 times daily for 14 days    Return to clinic in 3 months for a fasting, lab only appointment. Then return to clinic 2-3 days after to review results.     Options for treatment and follow-up care were reviewed with the patient. Juanita Salgado  engaged in the decision making process and verbalized understanding of the options discussed and agreed with the final plan.    Geni Carmen RN, DNP-FNP  student [facility] [facility] [employer]    History and physical examination done with student nurse practitioner.  Student acted as scribe for this encounter.  I agree with assessment and plan for this patient.     BETTY Tenorio CNP

## 2018-04-24 NOTE — PATIENT INSTRUCTIONS
Here is the plan from today's visit    1. Hyperlipidemia, unspecified hyperlipidemia type  - atorvastatin (LIPITOR) 20 MG tablet; Take 1 tablet (20 mg) by mouth daily  Dispense: 30 tablet; Refill: 11  - Comprehensive Metabolic Panel ([facility]); Future  - Lipid panel reflex to direct LDL Fasting; Future    2. Seasonal allergic rhinitis, unspecified chronicity, unspecified trigger  - fluticasone (FLONASE) 50 MCG/ACT spray; Spray 1 spray into both nostrils daily  Dispense: 1 Bottle; Refill: 11    3. Cervicalgia  - PHYSICAL THERAPY REFERRAL - EXTERNAL    4. Platelet count less 140,000 per cubic millimeter  - CBC with Plt ([facility]); Future    5. Other headache syndrome  - ibuprofen (ADVIL/MOTRIN) 800 MG tablet; Take 1 tablet (800 mg) by mouth every 8 hours as needed for moderate pain  Dispense: 90 tablet; Refill: 1    6. Tinea corporis  - ketoconazole (NIZORAL) 2 % cream; Apply topically 2 times daily for 14 days  Dispense: 30 g; Refill: 1    Follow-up in 3 months, may schedule fasting lab only appointment and then follow-up appointment.      Thank you for coming to [facility] today.  Lab Testing:  **If you had lab testing today and your results are reassuring or normal they will be mailed to you or sent through [employer] within 7 days.   **If the lab tests need quick action we will call you with the results.  The phone number we will call with results is # [phone] (home) . If this is not the best number please call our clinic and change the number.  Medication Refills:  If you need any refills please call your pharmacy and they will contact us.   If you need to  your refill at a new pharmacy, please contact the new pharmacy directly. The new pharmacy will help you get your medications transferred faster.   Scheduling:  If you have any concerns about today's visit or wish to schedule another appointment please call our office during normal business hours [phone] (8-5:00 M-F)  If a referral was made  to a [facility] and you don't get a call from central scheduling please call [phone].  If a Mammogram was ordered for you at [facility] call [phone] to schedule or change your appointment.  If you had an XRay/CT/Ultrasound/MRI ordered the number is [phone] to schedule or change your radiology appointment.   Medical Concerns:  If you have urgent medical concerns please call [phone] at any time of the day.  If you have a medical emergency please call [phone].

## 2018-04-24 NOTE — MR AVS SNAPSHOT
After Visit Summary   4/24/2018    Juanita Salgado    MRN: [id number]           Patient Information     Date Of Birth          1975        Visit Information        Provider Department      4/24/2018 10:20 AM Jailene Manuel APRN CNP [facility]        Today's Diagnoses     Hyperlipidemia, unspecified hyperlipidemia type    -  1    Seasonal allergic rhinitis, unspecified chronicity, unspecified trigger        Cervicalgia        Platelet count less 140,000 per cubic millimeter        Other headache syndrome        Tinea corporis          Care Instructions    Here is the plan from today's visit    1. Hyperlipidemia, unspecified hyperlipidemia type  - atorvastatin (LIPITOR) 20 MG tablet; Take 1 tablet (20 mg) by mouth daily  Dispense: 30 tablet; Refill: 11  - Comprehensive Metabolic Panel ([facility]); Future  - Lipid panel reflex to direct LDL Fasting; Future    2. Seasonal allergic rhinitis, unspecified chronicity, unspecified trigger  - fluticasone (FLONASE) 50 MCG/ACT spray; Spray 1 spray into both nostrils daily  Dispense: 1 Bottle; Refill: 11    3. Cervicalgia  - PHYSICAL THERAPY REFERRAL - EXTERNAL    4. Platelet count less 140,000 per cubic millimeter  - CBC with Plt ([facility]); Future    5. Other headache syndrome  - ibuprofen (ADVIL/MOTRIN) 800 MG tablet; Take 1 tablet (800 mg) by mouth every 8 hours as needed for moderate pain  Dispense: 90 tablet; Refill: 1    6. Tinea corporis  - ketoconazole (NIZORAL) 2 % cream; Apply topically 2 times daily for 14 days  Dispense: 30 g; Refill: 1    Follow-up in 3 months, may schedule fasting lab only appointment and then follow-up appointment.      Thank you for coming to [facility] today.  Lab Testing:  **If you had lab testing today and your results are reassuring or normal they will be mailed to you or sent through [employer] within 7 days.   **If the lab tests need quick action we will call you with the results.  The phone  number we will call with results is # [phone] (home) . If this is not the best number please call our clinic and change the number.  Medication Refills:  If you need any refills please call your pharmacy and they will contact us.   If you need to  your refill at a new pharmacy, please contact the new pharmacy directly. The new pharmacy will help you get your medications transferred faster.   Scheduling:  If you have any concerns about today's visit or wish to schedule another appointment please call our office during normal business hours [phone] (8-5:00 M-F)  If a referral was made to a [facility] and you don't get a call from central scheduling please call [phone].  If a Mammogram was ordered for you at [facility] call [phone] to schedule or change your appointment.  If you had an XRay/CT/Ultrasound/MRI ordered the number is [phone] to schedule or change your radiology appointment.   Medical Concerns:  If you have urgent medical concerns please call [phone] at any time of the day.  If you have a medical emergency please call [phone].            Follow-ups after your visit        Additional Services     PHYSICAL THERAPY REFERRAL - EXTERNAL       [facility]- already scheduled                  Future tests that were ordered for you today     Open Future Orders        Priority Expected Expires Ordered    CBC with Plt ([facility]) Routine  4/2/2019 4/24/2018    Comprehensive Metabolic Panel ([facility]) Routine  4/3/2019 4/24/2018    Lipid panel reflex to direct LDL Fasting Routine 4/24/2018 5/8/2019 4/24/2018            Who to contact     Please call your clinic at [phone] to:    Ask questions about your health    Make or cancel appointments    Discuss your medicines    Learn about your test results    Speak to your doctor            Additional Information About Your Visit        CPUsage Information     CPUsage is an electronic gateway that provides  easy, online access to your medical records. With [employer] (powering [employer]), you can request a clinic appointment, read your test results, renew a prescription or communicate with your care team.     To sign up for [employer] ([employer]) visit the website at [email or web address]   You will be asked to enter the access code listed below, as well as some personal information. Please follow the directions to create your username and password.     Your access code is: [email or web address]  Expires: 2018 12:05 PM     Your access code will  in 90 days. If you need help or a new code, please contact your [facility] or call [phone] for assistance.        Care EveryWhere ID     This is your Care EveryWhere ID. This could be used by other organizations to access your [place] medical records  [id number]        Your Vitals Were     Pulse Temperature Pulse Oximetry BMI (Body Mass Index)          89 98.6  F (37  C) (Oral) 100% 34.12 kg/m2         Blood Pressure from Last 3 Encounters:   18 119/79   18 136/83   17 131/86    Weight from Last 3 Encounters:   18 224 lb 6.4 oz (101.8 kg)   18 224 lb 9.6 oz (101.9 kg)   17 214 lb (97.1 kg)              We Performed the Following     PHYSICAL THERAPY REFERRAL - EXTERNAL          Today's Medication Changes          These changes are accurate as of 18 11:17 AM.  If you have any questions, ask your nurse or doctor.               Start taking these medicines.        Dose/Directions    atorvastatin 20 MG tablet   Commonly known as:  LIPITOR   Used for:  Hyperlipidemia, unspecified hyperlipidemia type   Started by:  Jailene Manuel APRN CNP        Dose:  20 mg   Take 1 tablet (20 mg) by mouth daily   Quantity:  30 tablet   Refills:  11       fluticasone 50 MCG/ACT spray   Commonly known as:  FLONASE   Used for:  Seasonal allergic rhinitis, unspecified chronicity, unspecified trigger   Started by:  Jailene Manuel APRN CNP         Dose:  1 spray   Spray 1 spray into both nostrils daily   Quantity:  1 Bottle   Refills:  11       * ibuprofen 600 MG tablet   Commonly known as:  ADVIL/MOTRIN   Used for:  Other headache syndrome   Started by:  Jailene Manuel APRN CNP        Dose:  600 mg   Take 1 tablet (600 mg) by mouth every 6 hours as needed for moderate pain   Quantity:  60 tablet   Refills:  3       * ibuprofen 800 MG tablet   Commonly known as:  ADVIL/MOTRIN   Used for:  Other headache syndrome   Started by:  Jailene Manuel APRN CNP        Dose:  800 mg   Take 1 tablet (800 mg) by mouth every 8 hours as needed for moderate pain   Quantity:  90 tablet   Refills:  1       * Notice:  This list has 2 medication(s) that are the same as other medications prescribed for you. Read the directions carefully, and ask your doctor or other care provider to review them with you.         Where to get your medicines      These medications were sent to [facility] [place], [place] - [address]   [address] [facility] [address]     Phone:  [phone]     atorvastatin 20 MG tablet    fluticasone 50 MCG/ACT spray    ibuprofen 600 MG tablet    ibuprofen 800 MG tablet    ketoconazole 2 % cream                Primary Care Provider Fax #    Physician No Ref-Primary [phone]       No address on file        Equal Access to Services     LINWOOD SHOOK : Barbara Escobar, wafarzaneh hairston, qaybta kaalmada adesaima, vicente motley. So [facility] [phone].    ATENCIÓN: Si habla español, tiene a cote disposición servicios gratuitos de asistencia lingüística. Llame al [phone].    We comply with applicable federal civil rights laws and [place] laws. We do not discriminate on the basis of race, color, national origin, age, disability, sex, sexual orientation, or gender identity.            Thank you!     Thank you for choosing [facility]  for your care. Our goal is  always to provide you with excellent care. Hearing back from our patients is one way we can continue to improve our services. Please take a few minutes to complete the written survey that you may receive in the mail after your visit with us. Thank you!             Your Updated Medication List - Protect others around you: Learn how to safely use, store and throw away your medicines at www.disposemymeds.org.          This list is accurate as of 4/24/18 11:17 AM.  Always use your most recent med list.                   Brand Name Dispense Instructions for use Diagnosis    acetaminophen 500 MG tablet    TYLENOL    100 tablet    Take 2 tablets (1,000 mg) by mouth every 8 hours as needed for mild pain    Viral URI, Throat pain, Tension headache       Adult Blood Pressure Cuff Lg Kit     1 kit    1 Device as needed    Benign essential hypertension       aspirin 81 MG chewable tablet     90 tablet    Take 1 tablet (81 mg) by mouth daily    Benign essential hypertension       atorvastatin 20 MG tablet    LIPITOR    30 tablet    Take 1 tablet (20 mg) by mouth daily    Hyperlipidemia, unspecified hyperlipidemia type       fluticasone 50 MCG/ACT spray    FLONASE    1 Bottle    Spray 1 spray into both nostrils daily    Seasonal allergic rhinitis, unspecified chronicity, unspecified trigger       hydrochlorothiazide 25 MG tablet    HYDRODIURIL    30 tablet    Take 1 tablet (25 mg) by mouth daily    Benign essential hypertension       * ibuprofen 600 MG tablet    ADVIL/MOTRIN    60 tablet    Take 1 tablet (600 mg) by mouth every 6 hours as needed for moderate pain    Other headache syndrome       * ibuprofen 800 MG tablet    ADVIL/MOTRIN    90 tablet    Take 1 tablet (800 mg) by mouth every 8 hours as needed for moderate pain    Other headache syndrome       ketoconazole 2 % cream    NIZORAL    30 g    Apply topically 2 times daily for 14 days    Tinea corporis       order for DME     1 Units    Equipment being ordered: blood  pressure machine    Healthcare maintenance       vitamin D 07321 UNIT capsule    ERGOCALCIFEROL    4 capsule    Take 1 capsule (50,000 Units) by mouth every 7 days for 16 doses    Vitamin D deficiency       * Notice:  This list has 2 medication(s) that are the same as other medications prescribed for you. Read the directions carefully, and ask your doctor or other care provider to review them with you.

## 2018-04-24 NOTE — NURSING NOTE
name: Sonam Dye  Language: French  Agency: [employer]  Phone number: [phone]  April ROSS Hernandez

## 2018-05-08 ENCOUNTER — TRANSFERRED RECORDS (OUTPATIENT)
Dept: HEALTH INFORMATION MANAGEMENT | Facility: CLINIC | Age: 43
End: 2018-05-08

## 2018-05-30 ENCOUNTER — TELEPHONE (OUTPATIENT)
Dept: FAMILY MEDICINE | Facility: CLINIC | Age: 43
End: 2018-05-30

## 2018-05-30 NOTE — TELEPHONE ENCOUNTER
Called patient 3 times using  line with no response. LVM. Sent out letter to patient.  Mirna Hernandez CMA

## 2018-05-31 ENCOUNTER — TELEPHONE (OUTPATIENT)
Dept: FAMILY MEDICINE | Facility: CLINIC | Age: 43
End: 2018-05-31

## 2018-05-31 NOTE — TELEPHONE ENCOUNTER
Called patient using  line.  [id number] [employer] left a detailed message for patient to call and schedule her MRA. This is the 3rd attempt to reach the patient.  Mirna Hernandez [facility]

## 2018-07-17 ENCOUNTER — OFFICE VISIT (OUTPATIENT)
Dept: FAMILY MEDICINE | Facility: CLINIC | Age: 43
End: 2018-07-17
Payer: COMMERCIAL

## 2018-07-17 VITALS
RESPIRATION RATE: 16 BRPM | SYSTOLIC BLOOD PRESSURE: 134 MMHG | TEMPERATURE: 98.6 F | DIASTOLIC BLOOD PRESSURE: 83 MMHG | BODY MASS INDEX: 33.66 KG/M2 | HEART RATE: 87 BPM | WEIGHT: 221.4 LBS | OXYGEN SATURATION: 100 %

## 2018-07-17 DIAGNOSIS — L03.116 CELLULITIS OF LEFT LOWER EXTREMITY: Primary | ICD-10-CM

## 2018-07-17 DIAGNOSIS — J06.9 VIRAL URI: ICD-10-CM

## 2018-07-17 DIAGNOSIS — R07.0 THROAT PAIN: ICD-10-CM

## 2018-07-17 DIAGNOSIS — G44.219 EPISODIC TENSION-TYPE HEADACHE, NOT INTRACTABLE: ICD-10-CM

## 2018-07-17 DIAGNOSIS — G44.209 TENSION HEADACHE: ICD-10-CM

## 2018-07-17 RX ORDER — SULFAMETHOXAZOLE AND TRIMETHOPRIM 400; 80 MG/1; MG/1
1 TABLET ORAL
COMMUNITY
Start: 2018-07-13 | End: 2018-07-20

## 2018-07-17 RX ORDER — CEPHALEXIN 500 MG/1
500 CAPSULE ORAL 4 TIMES DAILY
Qty: 28 CAPSULE | Refills: 0 | Status: SHIPPED | OUTPATIENT
Start: 2018-07-17 | End: 2018-09-11

## 2018-07-17 RX ORDER — CEPHALEXIN 500 MG/1
500 CAPSULE ORAL 2 TIMES DAILY
Qty: 14 CAPSULE | Refills: 0 | Status: SHIPPED | OUTPATIENT
Start: 2018-07-17 | End: 2018-07-17

## 2018-07-17 RX ORDER — ACETAMINOPHEN 500 MG
1000 TABLET ORAL EVERY 8 HOURS PRN
Qty: 100 TABLET | Refills: 0 | Status: SHIPPED | OUTPATIENT
Start: 2018-07-17 | End: 2018-11-01

## 2018-07-17 RX ORDER — CEPHALEXIN 500 MG/1
500 CAPSULE ORAL
COMMUNITY
Start: 2018-07-13 | End: 2018-07-17

## 2018-07-17 NOTE — MR AVS SNAPSHOT
After Visit Summary   7/17/2018    Juanita Salgado    MRN: [id number]           Patient Information     Date Of Birth          1975        Visit Information        Provider Department      7/17/2018 11:20 AM Savannah Dubose MD [facility]        Today's Diagnoses     Cellulitis of left lower extremity    -  1    Episodic tension-type headache, not intractable        Viral URI        Throat pain        Tension headache          Care Instructions    Here is the plan from today's visit    1. Cellulitis of left lower extremity  - cephALEXin (KEFLEX) 500 MG capsule; Take 1 capsule (500 mg) by mouth 4 times daily  Dispense: 28 capsule; Refill: 0    2. Episodic tension-type headache, not intractable  - PHYSICAL THERAPY REFERRAL  - acetaminophen (TYLENOL) 500 MG tablet; Take 2 tablets (1,000 mg) by mouth every 8 hours as needed for mild pain  Dispense: 100 tablet; Refill: 0    Please call or return to clinic if your symptoms don't go away.    Follow up plan  Please make a clinic appointment for follow up with your primary physician Damir Doe MD in 5 days for follow up for cellulitis.  Thank you for coming to [facility] today.  Lab Testing:  **If you had lab testing today and your results are reassuring or normal they will be mailed to you or sent through [employer] within 7 days.   **If the lab tests need quick action we will call you with the results.  The phone number we will call with results is # [phone] (home) . If this is not the best number please call our clinic and change the number.  Medication Refills:  If you need any refills please call your pharmacy and they will contact us.   If you need to  your refill at a new pharmacy, please contact the new pharmacy directly. The new pharmacy will help you get your medications transferred faster.   Scheduling:  If you have any concerns about today's visit or wish to schedule another appointment please call our  "office during normal business hours [phone] (8-5:00 M-F)  If a referral was made to a [facility] and you don't get a call from central scheduling please call [phone].  If a Mammogram was ordered for you at [facility] call [phone] to schedule or change your appointment.  If you had an XRay/CT/Ultrasound/MRI ordered the number is [phone] to schedule or change your radiology appointment.   Medical Concerns:  If you have urgent medical concerns please call [phone] at any time of the day.    Savannah Dubose MD            Follow-ups after your visit        Additional Services     PHYSICAL THERAPY REFERRAL       *This therapy referral will be filtered to a centralized scheduling office at [facility] and the patient will receive a call to schedule an appointment at a [facility] location most convenient for them. *     [facility] provides Physical Therapy evaluation and treatment and many specialty services across the [place] system.  If requesting a specialty program, please choose from the list below.    If you have not heard from the scheduling office within 2 business days, please call [phone] for all locations, with the exception of [place], please call [phone] and [facility], please call [phone]  Treatment: Evaluation & Treatment  Special Instructions/Modalities: Upper back stabilization  Special Programs: None    Please be aware that coverage of these services is subject to the terms and limitations of your health insurance plan.  Call member services at your health plan with any benefit or coverage questions.      **Note to Provider:  If you are referring outside of [facility] for the therapy appointment, please list the name of the location in the \"special instructions\" above, print the referral and give to the patient to schedule the appointment.                  Who to contact     Please call your " clinic at [phone] to:    Ask questions about your health    Make or cancel appointments    Discuss your medicines    Learn about your test results    Speak to your doctor            Additional Information About Your Visit        Care EveryWhere ID     This is your Care EveryWhere ID. This could be used by other organizations to access your [place] medical records  [id number]        Your Vitals Were     Pulse Temperature Respirations Last Period Pulse Oximetry BMI (Body Mass Index)    87 98.6  F (37  C) (Oral) 16 07/10/2018 100% 33.66 kg/m2       Blood Pressure from Last 3 Encounters:   07/17/18 134/83   04/24/18 119/79   04/17/18 136/83    Weight from Last 3 Encounters:   07/17/18 221 lb 6.4 oz (100.4 kg)   04/24/18 224 lb 6.4 oz (101.8 kg)   04/17/18 224 lb 9.6 oz (101.9 kg)              We Performed the Following     PHYSICAL THERAPY REFERRAL          Today's Medication Changes          These changes are accurate as of 7/17/18 12:24 PM.  If you have any questions, ask your nurse or doctor.               Start taking these medicines.        Dose/Directions    cephALEXin 500 MG capsule   Commonly known as:  KEFLEX   Used for:  Cellulitis of left lower extremity   Started by:  Savannah Dubose MD        Dose:  500 mg   Take 1 capsule (500 mg) by mouth 4 times daily   Quantity:  28 capsule   Refills:  0            Where to get your medicines      These medications were sent to [facility] [place], [place] - [address] [facility] [address]     Phone:  [phone]     acetaminophen 500 MG tablet    cephALEXin 500 MG capsule                Primary Care Provider Office Phone # Fax #    Damir Doe [phone]       [facility] [address] [facility] [address]  [facility] [address]        Equal Access to Services     LINWOOD SHOOK AH: Barbara Escobar, jennifer hairston, vicente michaels  ah. So [facility] [phone].    ATENCIÓN: Si leandro lagos, tiene a cote disposición servicios gratuitos de asistencia lingüística. Erin unger [phone].    We comply with applicable federal civil rights laws and [place] laws. We do not discriminate on the basis of race, color, national origin, age, disability, sex, sexual orientation, or gender identity.            Thank you!     Thank you for choosing \[facility]\"" [facility]  for your care. Our goal is always to provide you with excellent care. Hearing back from our patients is one way we can continue to improve our services. Please take a few minutes to complete the written survey that you may receive in the mail after your visit with us. Thank you!             Your Updated Medication List - Protect others around you: Learn how to safely use, store and throw away your medicines at www.disposemymeds.org.          This list is accurate as of 7/17/18 12:24 PM.  Always use your most recent med list.                   Brand Name Dispense Instructions for use Diagnosis    acetaminophen 500 MG tablet    TYLENOL    100 tablet    Take 2 tablets (1,000 mg) by mouth every 8 hours as needed for mild pain    Viral URI, Throat pain, Tension headache       Adult Blood Pressure Cuff Lg Kit     1 kit    1 Device as needed    Benign essential hypertension       aspirin 81 MG chewable tablet     90 tablet    Take 1 tablet (81 mg) by mouth daily    Benign essential hypertension       atorvastatin 20 MG tablet    LIPITOR    30 tablet    Take 1 tablet (20 mg) by mouth daily    Hyperlipidemia, unspecified hyperlipidemia type       cephALEXin 500 MG capsule    KEFLEX    28 capsule    Take 1 capsule (500 mg) by mouth 4 times daily    Cellulitis of left lower extremity       fluticasone 50 MCG/ACT spray    FLONASE    1 Bottle    Spray 1 spray into both nostrils daily    Seasonal allergic rhinitis, unspecified chronicity, unspecified trigger       hydrochlorothiazide 25 MG tablet     HYDRODIURIL    30 tablet    Take 1 tablet (25 mg) by mouth daily    Benign essential hypertension       ibuprofen 800 MG tablet    ADVIL/MOTRIN    90 tablet    Take 1 tablet (800 mg) by mouth every 8 hours as needed for moderate pain    Other headache syndrome       order for DME     1 Units    Equipment being ordered: blood pressure machine    Healthcare maintenance       sulfamethoxazole-trimethoprim 400-80 MG per tablet    BACTRIM/SEPTRA     Take 1 tablet by mouth        vitamin D 20047 UNIT capsule    ERGOCALCIFEROL    4 capsule    Take 1 capsule (50,000 Units) by mouth every 7 days for 16 doses    Vitamin D deficiency

## 2018-07-17 NOTE — PROGRESS NOTES
Preceptor Attestation:   Patient seen, evaluated and discussed with the resident. I have verified the content of the note, which accurately reflects my assessment of the patient and the plan of care.   Supervising Physician:  Shakira Saucedo MD

## 2018-07-17 NOTE — PATIENT INSTRUCTIONS
Here is the plan from today's visit    1. Cellulitis of left lower extremity  - cephALEXin (KEFLEX) 500 MG capsule; Take 1 capsule (500 mg) by mouth 4 times daily  Dispense: 28 capsule; Refill: 0    2. Episodic tension-type headache, not intractable  - PHYSICAL THERAPY REFERRAL  - acetaminophen (TYLENOL) 500 MG tablet; Take 2 tablets (1,000 mg) by mouth every 8 hours as needed for mild pain  Dispense: 100 tablet; Refill: 0    Please call or return to clinic if your symptoms don't go away.    Follow up plan  Please make a clinic appointment for follow up with your primary physician Damir Doe MD in 5 days for follow up for cellulitis.  Thank you for coming to [facility] today.  Lab Testing:  **If you had lab testing today and your results are reassuring or normal they will be mailed to you or sent through [employer] within 7 days.   **If the lab tests need quick action we will call you with the results.  The phone number we will call with results is # [phone] (home) . If this is not the best number please call our clinic and change the number.  Medication Refills:  If you need any refills please call your pharmacy and they will contact us.   If you need to  your refill at a new pharmacy, please contact the new pharmacy directly. The new pharmacy will help you get your medications transferred faster.   Scheduling:  If you have any concerns about today's visit or wish to schedule another appointment please call our office during normal business hours [phone] (8-5:00 M-F)  If a referral was made to a [facility] and you don't get a call from central scheduling please call [phone].  If a Mammogram was ordered for you at [facility] call [phone] to schedule or change your appointment.  If you had an XRay/CT/Ultrasound/MRI ordered the number is [phone] to schedule or change your radiology appointment.   Medical Concerns:  If you have urgent medical  concerns please call [phone] at any time of the day.    Savannah Dubose MD

## 2018-08-22 DIAGNOSIS — I10 BENIGN ESSENTIAL HYPERTENSION: ICD-10-CM

## 2018-08-22 RX ORDER — HYDROCHLOROTHIAZIDE 25 MG/1
25 TABLET ORAL DAILY
Qty: 90 TABLET | Refills: 1 | Status: SHIPPED | OUTPATIENT
Start: 2018-08-22 | End: 2018-09-11

## 2018-08-22 NOTE — TELEPHONE ENCOUNTER

## 2018-09-11 ENCOUNTER — OFFICE VISIT (OUTPATIENT)
Dept: FAMILY MEDICINE | Facility: CLINIC | Age: 43
End: 2018-09-11
Payer: COMMERCIAL

## 2018-09-11 VITALS
OXYGEN SATURATION: 98 % | DIASTOLIC BLOOD PRESSURE: 92 MMHG | BODY MASS INDEX: 34.7 KG/M2 | HEART RATE: 97 BPM | WEIGHT: 228.2 LBS | TEMPERATURE: 98.6 F | SYSTOLIC BLOOD PRESSURE: 130 MMHG

## 2018-09-11 DIAGNOSIS — I10 BENIGN ESSENTIAL HYPERTENSION: ICD-10-CM

## 2018-09-11 DIAGNOSIS — J06.9 VIRAL UPPER RESPIRATORY TRACT INFECTION: Primary | ICD-10-CM

## 2018-09-11 DIAGNOSIS — E78.5 HYPERLIPIDEMIA, UNSPECIFIED HYPERLIPIDEMIA TYPE: ICD-10-CM

## 2018-09-11 DIAGNOSIS — J30.2 SEASONAL ALLERGIC RHINITIS, UNSPECIFIED CHRONICITY, UNSPECIFIED TRIGGER: ICD-10-CM

## 2018-09-11 RX ORDER — ATORVASTATIN CALCIUM 20 MG/1
20 TABLET, FILM COATED ORAL DAILY
Qty: 90 TABLET | Refills: 3 | Status: SHIPPED | OUTPATIENT
Start: 2018-09-11

## 2018-09-11 RX ORDER — ASPIRIN 81 MG/1
81 TABLET, CHEWABLE ORAL DAILY
Qty: 90 TABLET | Refills: 3 | Status: SHIPPED | OUTPATIENT
Start: 2018-09-11 | End: 2018-10-22

## 2018-09-11 RX ORDER — MOMETASONE FUROATE MONOHYDRATE 50 UG/1
2 SPRAY, METERED NASAL DAILY
Qty: 1 BOX | Refills: 11 | Status: SHIPPED | OUTPATIENT
Start: 2018-09-11

## 2018-09-11 RX ORDER — HYDROCHLOROTHIAZIDE 25 MG/1
25 TABLET ORAL DAILY
Qty: 90 TABLET | Refills: 1 | Status: SHIPPED | OUTPATIENT
Start: 2018-09-11 | End: 2018-10-22

## 2018-09-11 ASSESSMENT — ENCOUNTER SYMPTOMS
COUGH: 1
VOMITING: 0
MYALGIAS: 1
NAUSEA: 0
NERVOUS/ANXIOUS: 0
SHORTNESS OF BREATH: 0
FATIGUE: 1
CHILLS: 0
DYSURIA: 0

## 2018-09-11 NOTE — PROGRESS NOTES
IVETH Salgado is a 43 year old  who presents for   Chief Complaint   Patient presents with     Allergies     Pt would like to be treated for allergies today. Pt complains of congestion and throat irritation       Acute Illness   Concerns: sort throat, congestion, runny nose.   When did it start? yesterday  Is it getting better, worse or staying the same? unchanged    Fatigue/Achiness?: YES     Fever?: No     Chills/Sweats?: No     Headache (location?) YES     Sinus Pressure?: YES     Eye redness/Discharge?: No     Ear Pain?: No     Runny nose?:  YES     Congestion?:  YES     Sore Throat?:  YES   Respiratory    Cough?:  YES-non-productive    Wheeze?: No   GI/    Decreased Appetite?: No     Nausea?:No     Vomiting?: No     Diarrhea?:  No     Dysuria/Frequency?.:No     Any Illness Exposure?: No     Any foreign travel or contact with anyone ill who travelled abroad? No     Therapies Tried and outcome: Nothing      A [employer]  was used for  this visit.    +++++++    Hypertension Follow-up  <140/90    Outpatient blood pressures are not being checked.    Chest Pain? :No     Low Salt Diet: no added salt    Daily NSAID Use?No     Did patient take their HTN pills today/last night as usual?  NO    Last Basic Metabolic Panel:  Lab Results   Component Value Date    .2 04/17/2018      Lab Results   Component Value Date    POTASSIUM 3.6 04/17/2018     Lab Results   Component Value Date    CHLORIDE 104.5 04/17/2018     Lab Results   Component Value Date    GRETA 8.8 04/17/2018     Lab Results   Component Value Date    CO2 27.0 04/17/2018     Lab Results   Component Value Date    BUN 15.4 04/17/2018     Lab Results   Component Value Date    CR 0.6 04/17/2018     Lab Results   Component Value Date    GLC 92.7 04/17/2018       Adherence and Exercise  Medication side effects: no  How often is a medication missed? Out of meds  Exercise: walking       Problem, Medication and Allergy Lists were reviewed and  updated if needed..    Patient is an established patient of this clinic..         Review of Systems:   Review of Systems   Constitutional: Positive for fatigue. Negative for chills.   Eyes: Negative for visual disturbance.   Respiratory: Positive for cough. Negative for shortness of breath.    Cardiovascular: Negative for chest pain.   Gastrointestinal: Negative for nausea and vomiting.   Genitourinary: Negative for dysuria.   Musculoskeletal: Positive for myalgias.   Psychiatric/Behavioral: The patient is not nervous/anxious.             Physical Exam:     Vitals:    09/11/18 1044 09/11/18 1047   BP: (!) 135/91 (!) 130/92   Pulse: 97    Temp: 98.6  F (37  C)    TempSrc: Oral    SpO2: 98%    Weight: 228 lb 3.2 oz (103.5 kg)      Body mass index is 34.7 kg/(m^2).  Vitals were reviewed and were normal     Physical Exam   Constitutional: She is oriented to person, place, and time. She appears well-developed and well-nourished.   HENT:   Head: Normocephalic and atraumatic.   Mouth/Throat: Oropharynx is clear and moist and mucous membranes are normal. No posterior oropharyngeal edema or posterior oropharyngeal erythema.   Eyes: Conjunctivae are normal. No scleral icterus.   Cardiovascular: Normal rate.    Pulmonary/Chest: Effort normal. No tachypnea. She has no wheezes.   Neurological: She is alert and oriented to person, place, and time.   Skin: Skin is warm and dry.   Psychiatric: She has a normal mood and affect. Her behavior is normal.         Results:   No testing ordered today    Assessment and Plan        1. Benign essential hypertension  Pt recommended to take meds as prescribed, as BP elevated today. Refilled meds.   - hydrochlorothiazide (HYDRODIURIL) 25 MG tablet; Take 1 tablet (25 mg) by mouth daily  Dispense: 90 tablet; Refill: 1  - aspirin 81 MG chewable tablet; Take 1 tablet (81 mg) by mouth daily  Dispense: 90 tablet; Refill: 3    2. Hyperlipidemia, unspecified hyperlipidemia type  Refilled meds at pt  states she is out.   - atorvastatin (LIPITOR) 20 MG tablet; Take 1 tablet (20 mg) by mouth daily  Dispense: 90 tablet; Refill: 3    3. Seasonal allergic rhinitis, unspecified chronicity, unspecified trigger  4. URI  Patient with viral URI symptoms.  Does also have seasonal allergies and has not taken medications recently.  Prescribed requested Nasonex.  Discussed and symptomatic management.  Lungs are clear.  recommended to follow-up in 2 weeks if things are not improved.  - mometasone (NASONEX) 50 MCG/ACT spray; Spray 2 sprays into both nostrils daily  Dispense: 1 Box; Refill: 11       Medications Discontinued During This Encounter   Medication Reason     cephALEXin (KEFLEX) 500 MG capsule Therapy completed     fluticasone (FLONASE) 50 MCG/ACT spray Stopped by Patient     hydrochlorothiazide (HYDRODIURIL) 25 MG tablet Reorder     aspirin 81 MG chewable tablet Reorder     atorvastatin (LIPITOR) 20 MG tablet Reorder       Options for treatment and follow-up care were reviewed with the patient. Juanita Salgado  engaged in the decision making process and verbalized understanding of the options discussed and agreed with the final plan.    Barron Starks DO, MA  Family Medicine PGY-2  [facility], [facility]   Pager: [phone]

## 2018-09-11 NOTE — NURSING NOTE
Due to patient being non-English speaking/uses sign language, an  was used for this visit. Only for face-to-face interpretation by an external agency, date and length of interpretation can be found on the scanned worksheet.     name: Nicolasa Mercer  Agency: [employer]  Language: Bhutanese   Telephone number: [phone]  Type of interpretation: Face-to-face, spoken     DUDLEY Centeno

## 2018-09-11 NOTE — MR AVS SNAPSHOT
After Visit Summary   9/11/2018    Juanita Salgado    MRN: [id number]           Patient Information     Date Of Birth          1975        Visit Information        Provider Department      9/11/2018 10:40 AM Barron Starks DO [facility]        Today's Diagnoses     Viral upper respiratory tract infection    -  1    Benign essential hypertension        Hyperlipidemia, unspecified hyperlipidemia type        Seasonal allergic rhinitis, unspecified chronicity, unspecified trigger           Follow-ups after your visit        Follow-up notes from your care team     Return if symptoms worsen or fail to improve.      Who to contact     Please call your clinic at [phone] to:    Ask questions about your health    Make or cancel appointments    Discuss your medicines    Learn about your test results    Speak to your doctor            Additional Information About Your Visit        Care EveryWhere ID     This is your Care EveryWhere ID. This could be used by other organizations to access your [place] medical records  [id number]        Your Vitals Were     Pulse Temperature Last Period Pulse Oximetry Breastfeeding? BMI (Body Mass Index)    97 98.6  F (37  C) (Oral) 09/05/2018 98% No 34.7 kg/m2       Blood Pressure from Last 3 Encounters:   09/11/18 (!) 130/92   07/17/18 134/83   04/24/18 119/79    Weight from Last 3 Encounters:   09/11/18 228 lb 3.2 oz (103.5 kg)   07/17/18 221 lb 6.4 oz (100.4 kg)   04/24/18 224 lb 6.4 oz (101.8 kg)              Today, you had the following     No orders found for display         Today's Medication Changes          These changes are accurate as of 9/11/18 11:59 PM.  If you have any questions, ask your nurse or doctor.               Start taking these medicines.        Dose/Directions    mometasone 50 MCG/ACT spray   Commonly known as:  NASONEX   Used for:  Seasonal allergic rhinitis, unspecified chronicity, unspecified trigger   Started by:  Tereza  DO Barron        Dose:  2 spray   Spray 2 sprays into both nostrils daily   Quantity:  1 Box   Refills:  11            Where to get your medicines      These medications were sent to [facility] [place], [place] - [address], [facility] [address]     Phone:  [phone]     aspirin 81 MG chewable tablet    atorvastatin 20 MG tablet    hydrochlorothiazide 25 MG tablet    mometasone 50 MCG/ACT spray                Primary Care Provider Office Phone # Fax #    Damir Chava Doe [phone]       [facility] [address]   [facility] [address]        Equal Access to Services     [facility]: Hadii aad ku hadasho Soace, waaxda luqadaha, qaybta kaalmada adeegyada, vicente kevin . So [facility] [phone].    ATENCIÓN: Si habla español, tiene a cote disposición servicios gratuitos de asistencia lingüística. [facility] [phone].    We comply with applicable federal civil rights laws and [place] laws. We do not discriminate on the basis of race, color, national origin, age, disability, sex, sexual orientation, or gender identity.            Thank you!     Thank you for choosing [facility]  for your care. Our goal is always to provide you with excellent care. Hearing back from our patients is one way we can continue to improve our services. Please take a few minutes to complete the written survey that you may receive in the mail after your visit with us. Thank you!             Your Updated Medication List - Protect others around you: Learn how to safely use, store and throw away your medicines at www.disposemymeds.org.          This list is accurate as of 9/11/18 11:59 PM.  Always use your most recent med list.                   Brand Name Dispense Instructions for use Diagnosis    acetaminophen 500 MG tablet    TYLENOL    100 tablet    Take 2 tablets (1,000 mg) by mouth every 8 hours as needed  for mild pain    Viral URI, Throat pain, Tension headache       Adult Blood Pressure Cuff Lg Kit     1 kit    1 Device as needed    Benign essential hypertension       aspirin 81 MG chewable tablet     90 tablet    Take 1 tablet (81 mg) by mouth daily    Benign essential hypertension       atorvastatin 20 MG tablet    LIPITOR    90 tablet    Take 1 tablet (20 mg) by mouth daily    Hyperlipidemia, unspecified hyperlipidemia type       hydrochlorothiazide 25 MG tablet    HYDRODIURIL    90 tablet    Take 1 tablet (25 mg) by mouth daily    Benign essential hypertension       ibuprofen 800 MG tablet    ADVIL/MOTRIN    90 tablet    Take 1 tablet (800 mg) by mouth every 8 hours as needed for moderate pain    Other headache syndrome       mometasone 50 MCG/ACT spray    NASONEX    1 Box    Spray 2 sprays into both nostrils daily    Seasonal allergic rhinitis, unspecified chronicity, unspecified trigger       order for DME     1 Units    Equipment being ordered: blood pressure machine    Healthcare maintenance       RESTASIS MULTIDOSE 0.05 % ophthalmic emulsion   Generic drug:  cycloSPORINE

## 2018-09-12 NOTE — PROGRESS NOTES
Preceptor Attestation:   Patient seen, evaluated and discussed with the resident. I have verified the content of the note, which accurately reflects my assessment of the patient and the plan of care.   Supervising Physician:  Marc Zacarias MD

## 2018-10-22 DIAGNOSIS — I10 BENIGN ESSENTIAL HYPERTENSION: ICD-10-CM

## 2018-10-22 RX ORDER — ASPIRIN 81 MG/1
81 TABLET, CHEWABLE ORAL DAILY
Qty: 90 TABLET | Refills: 3 | Status: SHIPPED | OUTPATIENT
Start: 2018-10-22 | End: 2019-01-22

## 2018-10-22 NOTE — TELEPHONE ENCOUNTER

## 2018-10-22 NOTE — TELEPHONE ENCOUNTER

## 2018-10-23 RX ORDER — HYDROCHLOROTHIAZIDE 25 MG/1
25 TABLET ORAL DAILY
Qty: 90 TABLET | Refills: 1 | Status: SHIPPED | OUTPATIENT
Start: 2018-10-23 | End: 2019-01-22

## 2018-10-30 ENCOUNTER — HEALTH MAINTENANCE LETTER (OUTPATIENT)
Age: 43
End: 2018-10-30

## 2018-11-01 ENCOUNTER — OFFICE VISIT (OUTPATIENT)
Dept: FAMILY MEDICINE | Facility: CLINIC | Age: 43
End: 2018-11-01
Payer: COMMERCIAL

## 2018-11-01 VITALS
WEIGHT: 236 LBS | HEART RATE: 82 BPM | TEMPERATURE: 98.8 F | OXYGEN SATURATION: 97 % | RESPIRATION RATE: 18 BRPM | DIASTOLIC BLOOD PRESSURE: 87 MMHG | BODY MASS INDEX: 35.88 KG/M2 | SYSTOLIC BLOOD PRESSURE: 131 MMHG

## 2018-11-01 DIAGNOSIS — G44.209 TENSION HEADACHE: ICD-10-CM

## 2018-11-01 DIAGNOSIS — M54.50 CHRONIC LOW BACK PAIN WITHOUT SCIATICA, UNSPECIFIED BACK PAIN LATERALITY: Primary | ICD-10-CM

## 2018-11-01 DIAGNOSIS — R07.0 THROAT PAIN: ICD-10-CM

## 2018-11-01 DIAGNOSIS — J06.9 VIRAL URI: ICD-10-CM

## 2018-11-01 DIAGNOSIS — G89.29 CHRONIC LOW BACK PAIN WITHOUT SCIATICA, UNSPECIFIED BACK PAIN LATERALITY: Primary | ICD-10-CM

## 2018-11-01 RX ORDER — ACETAMINOPHEN 500 MG
1000 TABLET ORAL EVERY 8 HOURS PRN
Qty: 100 TABLET | Refills: 11 | Status: SHIPPED | OUTPATIENT
Start: 2018-11-01

## 2018-11-01 NOTE — NURSING NOTE
Due to patient being non-English speaking/uses sign language, an  was used for this visit. Only for face-to-face interpretation by an external agency, date and length of interpretation can be found on the scanned worksheet.     name: Thomas Pickens    Agency: [employer]  Language: [place]ican   Telephone number: [phone]  Type of interpretation: Face-to-face, spoken

## 2018-11-01 NOTE — PATIENT INSTRUCTIONS
Thank you for coming to [facility].  Backpain  Take acetaminophen 2 tablets every 4 hours as needed. Do not exceed 6 tablets in 24 hours.  Use warm pad and or hot shower/bath to help relieve muscle tension  Referral for physical therapy to work with strengthening core muscles.    F/u in clinic in one month to discuss progress.      Medication Refills:  Hydrochlorothiazide will be refilled by your primary care physician    Scheduling:  If you have any concerns about today's visit or wish to schedule another appointment please call our office during normal business hours [phone] (8-5:00 M-F)    Medical Concerns:  If you have urgent medical concerns please call [phone] at any time of the day.  If you have a medical emergency please call [phone].  Again thank you for choosing [facility] and please let us know how we can best partner with you to improve you and your family's health.

## 2018-11-01 NOTE — MR AVS SNAPSHOT
After Visit Summary   11/1/2018    Juanita Salgado    MRN: [id number]           Patient Information     Date Of Birth          1975        Visit Information        Provider Department      11/1/2018 11:20 AM Kiara Powell MD [facility]        Today's Diagnoses     Chronic low back pain without sciatica, unspecified back pain laterality    -  1    Viral URI        Throat pain        Tension headache          Care Instructions    Thank you for coming to [facility].  Backpain  Take acetaminophen 2 tablets every 4 hours as needed. Do not exceed 6 tablets in 24 hours.  Use warm pad and or hot shower/bath to help relieve muscle tension  Referral for physical therapy to work with strengthening core muscles.    F/u in clinic in one month to discuss progress.      Medication Refills:  Hydrochlorothiazide will be refilled by your primary care physician    Scheduling:  If you have any concerns about today's visit or wish to schedule another appointment please call our office during normal business hours [phone] (8-5:00 M-F)    Medical Concerns:  If you have urgent medical concerns please call [phone] at any time of the day.  If you have a medical emergency please call 911.  Again thank you for choosing [facility] and please let us know how we can best partner with you to improve you and your family's health.            Follow-ups after your visit        Additional Services     PHYSICAL THERAPY REFERRAL - EXTERNAL       [address] [facility]   [phone]    [facility]   [address] [facility] [place]  [phone]                  Future tests that were ordered for you today     Open Future Orders        Priority Expected Expires Ordered    PHYSICAL THERAPY REFERRAL - EXTERNAL Routine  11/1/2019 11/1/2018            Who to contact     Please call your clinic at [phone] to:    Ask questions about your health    Make or cancel  appointments    Discuss your medicines    Learn about your test results    Speak to your doctor            Additional Information About Your Visit        Care EveryWhere ID     This is your Care EveryWhere ID. This could be used by other organizations to access your [facility] medical records  [id number]        Your Vitals Were     Pulse Temperature Respirations Last Period Pulse Oximetry Breastfeeding?    82 98.8  F (37.1  C) (Oral) 18 10/27/2018 97% No    BMI (Body Mass Index)                   35.88 kg/m2            Blood Pressure from Last 3 Encounters:   11/01/18 131/87   09/11/18 (!) 130/92   07/17/18 134/83    Weight from Last 3 Encounters:   11/01/18 107 kg (236 lb)   09/11/18 103.5 kg (228 lb 3.2 oz)   07/17/18 100.4 kg (221 lb 6.4 oz)                 Where to get your medicines      These medications were sent to [facility] [address] [place] [address] & [address] [address]     Phone:  [phone]     acetaminophen 500 MG tablet          Primary Care Provider Office Phone # Fax #    Damir Chava Doe [phone]       [facility] [address] [address]        Equal Access to Services     [facility] : Hadii susy arshad hadasho Soomaali, waaxda luqadaha, qaybta kaalmada adeegyada, vicente motley. So [facility] [phone].    ATENCIÓN: Si habla español, tiene a cote disposición servicios gratuitos de asistencia lingüística. Llame al [phone].    We comply with applicable federal civil rights laws and [place] laws. We do not discriminate on the basis of race, color, national origin, age, disability, sex, sexual orientation, or gender identity.            Thank you!     Thank you for choosing [facility]  for your care. Our goal is always to provide you with excellent care. Hearing back from our patients is one way we can continue to  improve our services. Please take a few minutes to complete the written survey that you may receive in the mail after your visit with us. Thank you!             Your Updated Medication List - Protect others around you: Learn how to safely use, store and throw away your medicines at www.disposemymeds.org.          This list is accurate as of 11/1/18 12:42 PM.  Always use your most recent med list.                   Brand Name Dispense Instructions for use Diagnosis    acetaminophen 500 MG tablet    TYLENOL    100 tablet    Take 2 tablets (1,000 mg) by mouth every 8 hours as needed for mild pain    Viral URI, Throat pain, Tension headache       Adult Blood Pressure Cuff Lg Kit     1 kit    1 Device as needed    Benign essential hypertension       aspirin 81 MG chewable tablet     90 tablet    Take 1 tablet (81 mg) by mouth daily    Benign essential hypertension       atorvastatin 20 MG tablet    LIPITOR    90 tablet    Take 1 tablet (20 mg) by mouth daily    Hyperlipidemia, unspecified hyperlipidemia type       hydrochlorothiazide 25 MG tablet    HYDRODIURIL    90 tablet    Take 1 tablet (25 mg) by mouth daily    Benign essential hypertension       ibuprofen 800 MG tablet    ADVIL/MOTRIN    90 tablet    Take 1 tablet (800 mg) by mouth every 8 hours as needed for moderate pain    Other headache syndrome       mometasone 50 MCG/ACT spray    NASONEX    1 Box    Spray 2 sprays into both nostrils daily    Seasonal allergic rhinitis, unspecified chronicity, unspecified trigger       order for DME     1 Units    Equipment being ordered: blood pressure machine    Healthcare maintenance       RESTASIS MULTIDOSE 0.05 % ophthalmic emulsion   Generic drug:  cycloSPORINE

## 2018-11-02 ASSESSMENT — ENCOUNTER SYMPTOMS
FATIGUE: 0
MYALGIAS: 1
DIARRHEA: 0
NAUSEA: 0
EYE REDNESS: 0
FLANK PAIN: 0
DYSURIA: 0
DIAPHORESIS: 0
DIFFICULTY URINATING: 0
SORE THROAT: 0
NECK STIFFNESS: 0
EYE ITCHING: 0
SINUS PRESSURE: 0
ABDOMINAL DISTENTION: 0
HEADACHES: 0
EYE DISCHARGE: 0
FEVER: 0
ACTIVITY CHANGE: 0
LIGHT-HEADEDNESS: 0
BACK PAIN: 1
SHORTNESS OF BREATH: 0
SINUS PAIN: 0
RHINORRHEA: 0
PALPITATIONS: 0
NUMBNESS: 0
VOMITING: 0
CHILLS: 0
HEMATURIA: 0
APPETITE CHANGE: 0
EYE PAIN: 0
STRIDOR: 0
WHEEZING: 0
CONSTIPATION: 0
COUGH: 0
DIZZINESS: 0
NECK PAIN: 0
ABDOMINAL PAIN: 0

## 2018-11-02 NOTE — PROGRESS NOTES
IVETH Salgado is a 43 year old  who presents for   Chief Complaint   Patient presents with     Back Pain      back pain X1 week     - Pain occurs every month for 1-2 weeks. Pain episodes have recently become more severe and have started to last longer in duration. This episode started one week ago.  - Pain coordinates with her menses every month.  - Pt uses tylenol to help control pain, but only when the pain is really bad.  - Pain is worse with any movement. Pt reports some decreased strength. States that she is limited by the pain.  - Pain is decreased when laying on her side. Laying on her back and sitting up are not comfortable.  - Pt has previously used back massage to help with pain. Pt states that this has worked well in the past.    A [employer]  was used for  this visit.    +++++++    Problem, Medication and Allergy Lists were reviewed and updated if needed..    Patient is an established patient of this clinic..         Review of Systems:   Review of Systems   Constitutional: Negative for activity change, appetite change, chills, diaphoresis, fatigue and fever.   HENT: Negative for congestion, ear pain, hearing loss, rhinorrhea, sinus pain, sinus pressure, sneezing and sore throat.    Eyes: Negative for pain, discharge, redness and itching.   Respiratory: Negative for cough, shortness of breath, wheezing and stridor.    Cardiovascular: Negative for chest pain, palpitations and leg swelling.   Gastrointestinal: Negative for abdominal distention, abdominal pain, constipation, diarrhea, nausea and vomiting.   Endocrine: Negative for cold intolerance and heat intolerance.   Genitourinary: Negative for difficulty urinating, dysuria, flank pain and hematuria.   Musculoskeletal: Positive for back pain and myalgias. Negative for gait problem, neck pain and neck stiffness.   Neurological: Negative for dizziness, light-headedness, numbness and headaches.            Physical Exam:     Vitals:     11/01/18 1126   BP: 131/87   BP Location: Left arm   Patient Position: Chair   Cuff Size: Adult Large   Pulse: 82   Resp: 18   Temp: 98.8  F (37.1  C)   TempSrc: Oral   SpO2: 97%   Weight: 236 lb (107 kg)     Body mass index is 35.88 kg/(m^2).  Vitals were reviewed and were normal     Physical Exam  Exam:  Constitutional: healthy, alert and no distress  ENT: ENT exam normal, no neck nodes or sinus tenderness  Cardiovascular: PMI normal. No lifts, heaves, or thrills. RRR. No murmurs, clicks gallops or rub  Respiratory: Percussion normal. Good diaphragmatic excursion. Lungs clear  Gastrointestinal: Abdomen soft, non-tender. BS normal. No masses, organomegaly  : Deferred  Musculoskeletal: normal muscle tone, normal range of motion but with pain during flexion, extension, and rotation of the back and tender to palpation paraspinous muscles tender to palpation, more severe in lumbar spine  Neurologic: Gait normal. Reflexes normal and symmetric. Sensation grossly WNL.        Results:       Assessment and Plan     Juanita was seen today for chronic recurrent back pain that occurs in coordination with her menses every month.    Chronic low back pain without sciatica, unspecified back pain lateralityPain in coordination with her menses. Episodes have occurred for ~1.5 years but have recently worsened. Paraspinous muscles were tight on exam, no nerve impingement symptoms at this time. Discussed possible causes of pain, including menstrual related muscle cramps, back muscle overuse, and endometriosis. Developed plan with patient to manage pain with tylenol, utilize hot pad/shower/bath to help muscles relax, and offered referral for physical therapy to help with core strengthening. If symptoms do not improve in 1-2 months time, pt should return to clinic for further workup, including for endometriosis. Discussed treatment options for endometriosis, including hormonal options. Pt was agreeable to this plan.  -     PHYSICAL  THERAPY REFERRAL - EXTERNAL; Future      Medications Discontinued During This Encounter   Medication Reason     acetaminophen (TYLENOL) 500 MG tablet Reorder       Options for treatment and follow-up care were reviewed with the patient. Juanita Salgado  engaged in the decision making process and verbalized understanding of the options discussed and agreed with the final plan.      Geovani Rosa  [facility] Medical Student (MS3)  Pager: [phone]\    Preceptor Attestation:  I was present with the medical student who participated in the service and in the documentation of this note. I have verified the history and personally performed the physical exam and medical decision making. I have verified the content of the note, which accurately reflects my assessment of the patient and the plan of care.   Supervising Physician:  Kiara Powell MD

## 2018-11-27 ENCOUNTER — OFFICE VISIT (OUTPATIENT)
Dept: FAMILY MEDICINE | Facility: CLINIC | Age: 43
End: 2018-11-27
Payer: COMMERCIAL

## 2018-11-27 VITALS
WEIGHT: 230.2 LBS | TEMPERATURE: 98.5 F | HEART RATE: 79 BPM | SYSTOLIC BLOOD PRESSURE: 135 MMHG | DIASTOLIC BLOOD PRESSURE: 89 MMHG | BODY MASS INDEX: 35 KG/M2 | OXYGEN SATURATION: 100 %

## 2018-11-27 DIAGNOSIS — Z86.79 HISTORY OF ANEURYSM: ICD-10-CM

## 2018-11-27 DIAGNOSIS — M79.661 PAIN IN BOTH LOWER LEGS: Primary | ICD-10-CM

## 2018-11-27 DIAGNOSIS — D64.9 ANEMIA, UNSPECIFIED TYPE: ICD-10-CM

## 2018-11-27 DIAGNOSIS — M79.662 PAIN IN BOTH LOWER LEGS: Primary | ICD-10-CM

## 2018-11-27 LAB
% GRANULOCYTES: 42.2 %G (ref 40–75)
BUN SERPL-MCNC: 5.5 MG/DL (ref 7–19)
CALCIUM SERPL-MCNC: 9.3 MG/DL (ref 8.5–10.1)
CHLORIDE SERPLBLD-SCNC: 101.2 MMOL/L (ref 98–110)
CO2 SERPL-SCNC: 26.3 MMOL/L (ref 20–32)
CREAT SERPL-MCNC: 0.6 MG/DL (ref 0.5–1)
GFR SERPL CREATININE-BSD FRML MDRD: >90 ML/MIN/1.7 M2
GLUCOSE SERPL-MCNC: 103.8 MG'DL (ref 70–99)
GRANULOCYTES #: 1.8 K/UL (ref 1.6–8.3)
HBA1C MFR BLD: 5.8 % (ref 4.1–5.7)
HCT VFR BLD AUTO: 39.1 % (ref 35–47)
HEMOGLOBIN: 11.6 G/DL (ref 11.7–15.7)
LYMPHOCYTES # BLD AUTO: 2 K/UL (ref 0.8–5.3)
LYMPHOCYTES NFR BLD AUTO: 47.7 %L (ref 20–48)
MCH RBC QN AUTO: 25 PG (ref 26.5–35)
MCHC RBC AUTO-ENTMCNC: 29.7 G/DL (ref 32–36)
MCV RBC AUTO: 84.3 FL (ref 78–100)
MID #: 0.4 K/UL (ref 0–2.2)
MID %: 10.1 %M (ref 0–20)
PLATELET # BLD AUTO: 122 K/UL (ref 150–450)
POTASSIUM SERPL-SCNC: 4 MMOL/DL (ref 3.3–4.5)
RBC # BLD AUTO: 4.64 M/UL (ref 3.8–5.2)
SODIUM SERPL-SCNC: 136.2 MMOL/L (ref 132.6–141.4)
WBC # BLD AUTO: 4.2 K/UL (ref 4–11)

## 2018-11-27 RX ORDER — FERROUS SULFATE 325(65) MG
325 TABLET ORAL
Qty: 30 TABLET | Refills: 2 | Status: SHIPPED | OUTPATIENT
Start: 2018-11-27

## 2018-11-27 RX ORDER — GABAPENTIN 100 MG/1
100 CAPSULE ORAL 3 TIMES DAILY
Qty: 90 CAPSULE | Refills: 0 | Status: SHIPPED | OUTPATIENT
Start: 2018-11-27 | End: 2019-01-22

## 2018-11-27 ASSESSMENT — ENCOUNTER SYMPTOMS
FATIGUE: 1
DIZZINESS: 0
HEADACHES: 0
UNEXPECTED WEIGHT CHANGE: 0
FEVER: 0
DYSPHORIC MOOD: 0
CHEST TIGHTNESS: 0
MYALGIAS: 1
PALPITATIONS: 0
NAUSEA: 0
SHORTNESS OF BREATH: 0
DIARRHEA: 0
CHILLS: 0
ABDOMINAL PAIN: 0
NERVOUS/ANXIOUS: 0

## 2018-11-27 NOTE — NURSING NOTE
Due to patient being non-English speaking/uses sign language, an  was used for this visit. Only for face-to-face interpretation by an external agency, date and length of interpretation can be found on the scanned worksheet.     name: Sonam Dye  Agency: [employer]  Language: Citizen of [place]   Telephone number: [phone]  Type of interpretation: Face-to-face, spoken

## 2018-11-27 NOTE — LETTER
November 27, 2018      Juanita Salgado  [address]  [facility] [address]        Dear [facility],    Thank you for getting your care at [facility]. Please see below for your test results.    Resulted Orders   CBC with Diff Plt ([facility])   Result Value Ref Range    WBC 4.2 4.0 - 11.0 K/uL    Lymphocytes # 2.0 0.8 - 5.3 K/uL    % Lymphocytes 47.7 20.0 - 48.0 %L    Mid # 0.4 0.0 - 2.2 K/uL    Mid % 10.1 0.0 - 20.0 %M    GRANULOCYTES # 1.8 1.6 - 8.3 K/uL    % Granulocytes 42.2 40.0 - 75.0 %G    RBC 4.64 3.80 - 5.20 M/uL    Hemoglobin 11.6 (L) 11.7 - 15.7 g/dL    Hematocrit 39.1 35.0 - 47.0 %    MCV 84.3 78.0 - 100.0 fL    MCH 25.0 (L) 26.5 - 35.0 pg    MCHC 29.7 (L) 32.0 - 36.0 g/dL    Platelets 122.0 (L) 150.0 - 450.0 K/uL   Hemoglobin A1c ([facility])   Result Value Ref Range    Hemoglobin A1C 5.8 (H) 4.1 - 5.7 %         You need to start Iron for your low blood counts. I have sent the medicine to the Pharmacy. Please return to discuss results if you have any questions      If you have any concerns about these results please call and leave a message for me or send a [employer] message to the clinic.    Sincerely,    Edgar Chappell, DO

## 2018-11-27 NOTE — MR AVS SNAPSHOT
After Visit Summary   11/27/2018    Juanita Salgado    MRN: [id number]           Patient Information     Date Of Birth          1975        Visit Information        Provider Department      11/27/2018 10:40 AM Edgar Chappell DO [facility]        Today's Diagnoses     Pain in both lower legs    -  1    History of aneurysm        Anemia, unspecified type           Follow-ups after your visit        Who to contact     Please call your clinic at [phone] to:    Ask questions about your health    Make or cancel appointments    Discuss your medicines    Learn about your test results    Speak to your doctor            Additional Information About Your Visit        Care EveryWhere ID     This is your Care EveryWhere ID. This could be used by other organizations to access your [place] medical records  [id number]        Your Vitals Were     Pulse Temperature Last Period Pulse Oximetry BMI (Body Mass Index)       79 98.5  F (36.9  C) (Oral) 11/24/2018 (Exact Date) 100% 35 kg/m2        Blood Pressure from Last 3 Encounters:   11/27/18 135/89   11/01/18 131/87   09/11/18 (!) 130/92    Weight from Last 3 Encounters:   11/27/18 230 lb 3.2 oz (104.4 kg)   11/01/18 236 lb (107 kg)   09/11/18 228 lb 3.2 oz (103.5 kg)              We Performed the Following     Basic Metabolic Panel ([facility])     CBC with Diff Plt ([facility])     Hemoglobin A1c ([facility])          Today's Medication Changes          These changes are accurate as of 11/27/18 11:59 PM.  If you have any questions, ask your nurse or doctor.               Start taking these medicines.        Dose/Directions    ferrous sulfate 325 (65 Fe) MG tablet   Commonly known as:  FEROSUL   Used for:  Anemia, unspecified type   Started by:  Edgar Chappell DO        Dose:  325 mg   Take 1 tablet (325 mg) by mouth daily (with breakfast)   Quantity:  30 tablet   Refills:  2       gabapentin 100 MG capsule   Commonly known as:  NEURONTIN    Used for:  Pain in both lower legs   Started by:  Edgar Chappell DO        Dose:  100 mg   Take 1 capsule (100 mg) by mouth 3 times daily   Quantity:  90 capsule   Refills:  0         These medicines have changed or have updated prescriptions.        Dose/Directions    * aspirin 81 MG chewable tablet   Commonly known as:  ASA   This may have changed:  Another medication with the same name was added. Make sure you understand how and when to take each.   Used for:  Benign essential hypertension   Changed by:  Edgar Chappell DO        Dose:  81 mg   Take 1 tablet (81 mg) by mouth daily   Quantity:  90 tablet   Refills:  3       * aspirin 81 MG EC tablet   Commonly known as:  ASA   This may have changed:  You were already taking a medication with the same name, and this prescription was added. Make sure you understand how and when to take each.   Used for:  History of aneurysm   Changed by:  Edgar Chappell DO        Dose:  81 mg   Take 1 tablet (81 mg) by mouth daily   Quantity:  90 tablet   Refills:  3       * Notice:  This list has 2 medication(s) that are the same as other medications prescribed for you. Read the directions carefully, and ask your doctor or other care provider to review them with you.         Where to get your medicines      These medications were sent to [facility] [place], [place] - [address] [facility]  [address] [id number]     Phone:  [phone]     aspirin 81 MG EC tablet    ferrous sulfate 325 (65 Fe) MG tablet    gabapentin 100 MG capsule                Primary Care Provider Office Phone # Fax #    Damir Doe [phone]       [address] [address]        Equal Access to Services     [facility] AH: Hadii susy andradeo Soace, waaxda luqadaha, qaybta kaalmada madelyn, vicente motley. So [facility] [phone].    ATENCIÓN: Si alvinala español, tiene a cote disposición  servicios gratuitos de asistencia lingüística. Erin unger [phone].    We comply with applicable federal civil rights laws and [place] laws. We do not discriminate on the basis of race, color, national origin, age, disability, sex, sexual orientation, or gender identity.            Thank you!     Thank you for choosing [facility]  for your care. Our goal is always to provide you with excellent care. Hearing back from our patients is one way we can continue to improve our services. Please take a few minutes to complete the written survey that you may receive in the mail after your visit with us. Thank you!             Your Updated Medication List - Protect others around you: Learn how to safely use, store and throw away your medicines at www.disposemymeds.org.          This list is accurate as of 11/27/18 11:59 PM.  Always use your most recent med list.                   Brand Name Dispense Instructions for use Diagnosis    acetaminophen 500 MG tablet    TYLENOL    100 tablet    Take 2 tablets (1,000 mg) by mouth every 8 hours as needed for mild pain    Viral URI, Throat pain, Tension headache       Adult Blood Pressure Cuff Lg Kit     1 kit    1 Device as needed    Benign essential hypertension       * aspirin 81 MG chewable tablet    ASA    90 tablet    Take 1 tablet (81 mg) by mouth daily    Benign essential hypertension       * aspirin 81 MG EC tablet    ASA    90 tablet    Take 1 tablet (81 mg) by mouth daily    History of aneurysm       atorvastatin 20 MG tablet    LIPITOR    90 tablet    Take 1 tablet (20 mg) by mouth daily    Hyperlipidemia, unspecified hyperlipidemia type       ferrous sulfate 325 (65 Fe) MG tablet    FEROSUL    30 tablet    Take 1 tablet (325 mg) by mouth daily (with breakfast)    Anemia, unspecified type       gabapentin 100 MG capsule    NEURONTIN    90 capsule    Take 1 capsule (100 mg) by mouth 3 times daily    Pain in both lower legs       hydrochlorothiazide  25 MG tablet    HYDRODIURIL    90 tablet    Take 1 tablet (25 mg) by mouth daily    Benign essential hypertension       ibuprofen 800 MG tablet    ADVIL/MOTRIN    90 tablet    Take 1 tablet (800 mg) by mouth every 8 hours as needed for moderate pain    Other headache syndrome       mometasone 50 MCG/ACT nasal spray    NASONEX    1 Box    Spray 2 sprays into both nostrils daily    Seasonal allergic rhinitis, unspecified chronicity, unspecified trigger       order for DME     1 Units    Equipment being ordered: blood pressure machine    Healthcare maintenance       RESTASIS MULTIDOSE 0.05 % ophthalmic emulsion   Generic drug:  cycloSPORINE           * Notice:  This list has 2 medication(s) that are the same as other medications prescribed for you. Read the directions carefully, and ask your doctor or other care provider to review them with you.

## 2018-11-27 NOTE — PROGRESS NOTES
IVETH Salgado is a 43 year old  who presents for   Chief Complaint   Patient presents with     RECHECK     no energy, pain in both legs, ongoing     Patient here with chief complaint of bilateral leg pain. Pain is located on posterior calf bilaterally. Skin and muscle is very tender to the touch. Has been hurting for about 2-3 months.  Pain is worse with walking. Driving the car causes pain. There is some swelling along right leg. She describes the pain as a burning sensation. Pain is also bad at night time and will wake her up from sleep. Pain has DM Type 2, last A1c in April was 5.9    She is also very fatigued. She works and also has 5 young kids. Has been fatigued for months. Little motivation to do anything, especially exercise        A [employer]  was used for  this visit.    +++++++      Problem, Medication and Allergy Lists were reviewed and updated if needed..    Patient is an established patient of this clinic..         Review of Systems:   Review of Systems   Constitutional: Positive for fatigue. Negative for chills, fever and unexpected weight change.   HENT: Negative.    Respiratory: Negative for chest tightness and shortness of breath.    Cardiovascular: Negative for palpitations and leg swelling.   Gastrointestinal: Negative for abdominal pain, diarrhea and nausea.   Musculoskeletal: Positive for myalgias (leg pains).   Neurological: Negative for dizziness and headaches.   Psychiatric/Behavioral: Negative for dysphoric mood. The patient is not nervous/anxious.             Physical Exam:     Vitals:    11/27/18 1056 11/27/18 1057   BP: (!) 149/91 135/89   Pulse: 79    Temp: 98.5  F (36.9  C)    TempSrc: Oral    SpO2: 100%    Weight: 230 lb 3.2 oz (104.4 kg)      Body mass index is 35 kg/(m^2).  Vital signs normal except 1st BP check     Physical Exam  General- No acute distress, well-appearing  Skin- warm, no obvious skin lesions  Neuro- AOX3, CN 2-12 grossly intact  MSK-  bilateral legs mildly tender to palpation, no edema, no pitting, no skin breakdown  Cardio- RRR, no murmurs  Pulmonary- CTA in all fields, no wheezes or rhales  Psych- appropriate mood and affect      Results:   Results are ordered and pending    Assessment and Plan        Juanita was seen today for recheck.    Diagnoses and all orders for this visit:      Pain in both lower legs  Patient is an 81 patient has pain in both legs described as burning in nature and especially in conjunction with her known diabetes and concern this could be early peripheral neuropathy.  She also is on a thiazide diuretic and has not had her electrolytes checked for many months so I think it would be wise to recheck to make sure that is not contributing to some muscle spasms.  We will start her on a low-dose of gabapentin 100 mg 3 times a day to see if she gets any benefit      -     Basic Metabolic Panel ([facility])  -     CBC with Diff Plt ([facility])  -     Hemoglobin A1c ([facility])  -     gabapentin (NEURONTIN) 100 MG capsule; Take 1 capsule (100 mg) by mouth 3 times daily    History of aneurysm  Patient had follow-up MRA of her aneurysm at [facility] earlier this year.  I reviewed the MRI reports which revealed a stable aneurysm.  Patient has been advised that she needs to continue on aspirin, statin and have good blood pressure control to limit the progression of her aneurysm.  We discussed this in detail.    -     aspirin (ASA) 81 MG EC tablet; Take 1 tablet (81 mg) by mouth daily          Addendum    Hemoglobin came back low. Will start Iron supplement.           There are no discontinued medications.    Options for treatment and follow-up care were reviewed with the patient. Juanita Salgado  engaged in the decision making process and verbalized understanding of the options discussed and agreed with the final plan.    Edgar Chappell,

## 2018-12-05 ENCOUNTER — OFFICE VISIT (OUTPATIENT)
Dept: FAMILY MEDICINE | Facility: CLINIC | Age: 43
End: 2018-12-05
Payer: COMMERCIAL

## 2018-12-05 VITALS
DIASTOLIC BLOOD PRESSURE: 85 MMHG | BODY MASS INDEX: 34.12 KG/M2 | HEART RATE: 77 BPM | RESPIRATION RATE: 14 BRPM | SYSTOLIC BLOOD PRESSURE: 129 MMHG | OXYGEN SATURATION: 99 % | WEIGHT: 224.4 LBS

## 2018-12-05 DIAGNOSIS — G62.9 PERIPHERAL POLYNEUROPATHY: ICD-10-CM

## 2018-12-05 DIAGNOSIS — R73.09 ELEVATED HEMOGLOBIN A1C: Primary | ICD-10-CM

## 2018-12-05 DIAGNOSIS — M25.519 SHOULDER PAIN, UNSPECIFIED CHRONICITY, UNSPECIFIED LATERALITY: ICD-10-CM

## 2018-12-05 LAB
CHOLEST SERPL-MCNC: 250.1 MG/DL (ref 0–200)
CHOLEST/HDLC SERPL: 5.5 {RATIO} (ref 0–5)
FOLATE SERPL-MCNC: 19 NG/ML
GLUCOSE SERPL-MCNC: 103 MG'DL (ref 70–99)
HDLC SERPL-MCNC: 45.4 MG/DL
LDLC SERPL CALC-MCNC: 160 MG/DL (ref 0–129)
TRIGL SERPL-MCNC: 223 MG/DL (ref 0–150)
VIT B12 SERPL-MCNC: 592 PG/ML (ref 193–986)
VLDL CHOLESTEROL: 44.6 MG/DL (ref 7–32)

## 2018-12-05 ASSESSMENT — ENCOUNTER SYMPTOMS
WEAKNESS: 1
FEVER: 0
FATIGUE: 1
MYALGIAS: 1
CHILLS: 0

## 2018-12-05 NOTE — NURSING NOTE
Due to patient being non-English speaking/uses sign language, an  was used for this visit. Only for face-to-face interpretation by an external agency, date and length of interpretation can be found on the scanned worksheet.     name: KI  Agency: [employer]  Language: Venezuelan   Telephone number: [phone]  Type of interpretation: Face-to-face, spoken   FANYN Gates

## 2018-12-05 NOTE — PROGRESS NOTES
Preceptor Attestation:   Patient seen, evaluated and discussed with the resident. I have verified the content of the note, which accurately reflects my assessment of the patient and the plan of care.   Supervising Physician:  Rodriguez Bosch MD

## 2018-12-05 NOTE — PROGRESS NOTES
HPI       Juanita Salgado is a 43 year old  who presents for   Chief Complaint   Patient presents with     RECHECK     f/u on iron     Blood Draw     Patient is here to follow-up on blood test from last time. She was noted to have anemia, hemoglobin of 11.6. She reports lots of fatigue. Of note she reports she has had low WBC before. Platelets also on lower end of normal.    During her last visit we tested for diabetes given her symptoms of neuropathy. Had Hemoglobin A1c of 5.8. She would also like to get her cholesterol checked today as she is fasting.    Patient was started on gabapentin last visit for possible neuropathy. She repots significant improvement in her symptoms.    Patient also repots some pain in her upper back and shoulders. She did massage therapy, which helped. She would like to be referred there.        A [employer]  was used for  this visit.    +++++++      Problem, Medication and Allergy Lists were reviewed and updated if needed..    Patient is an established patient of this clinic..         Review of Systems:   Review of Systems   Constitutional: Positive for fatigue. Negative for chills and fever.   Musculoskeletal: Positive for myalgias.   Neurological: Positive for weakness.            Physical Exam:     Vitals:    12/05/18 0828   BP: 129/85   Pulse: 77   Resp: 14   SpO2: 99%   Weight: 224 lb 6.4 oz (101.8 kg)     Body mass index is 34.12 kg/(m^2).  Vitals were reviewed and were normal     Physical Exam  General- No acute distress, well-appearing  Skin- warm, no obvious skin lesions  Neuro- AOX3, CN 2-12 grossly intact  Cardio- RRR, no murmurs  Pulmonary- CTA in all fields, no wheezes or rhales  Psych- appropriate mood and affect    Results:   Results are ordered and pending    Assessment and Plan        Juanita was seen today for recheck and blood draw.    Diagnoses and all orders for this visit:    Elevated hemoglobin A1c    Patient would like to check her fasting blood sugar given  her recently elevated A1c. Patient with prediabetes.  We will also screen for hyperlipidemia given her weight and elevated A1c.    -     Glucose ([facility])  -     Lipid Cascade ([facility])    Peripheral polyneuropathy    Unlikely that her neuropathy is from prediabetes. She has had low WBC, platelets in past, and now has low Hgb. Will check B12 and folate levels. Gabapentin has provided some relief, so I recommend she continue it.  -     Vitamin B12  -     Folate    Shoulder pain, unspecified chronicity, unspecified laterality    Provided rx for [facility]    -     PHYSICAL THERAPY REFERRAL - EXTERNAL           There are no discontinued medications.    Options for treatment and follow-up care were reviewed with the patient. Juanita Salgado  engaged in the decision making process and verbalized understanding of the options discussed and agreed with the final plan.    Edgar Chappell, DO

## 2018-12-05 NOTE — LETTER
December 5, 2018      Juanita Salgado  [address]  [facility] [address]        Dear [facility],    Thank you for getting your care at [facility]. Please see below for your test results.    Resulted Orders   Glucose ([facility])   Result Value Ref Range    Glucose 103.0 (H) 70.0 - 99.0 mg'dL   Lipid Cascade ([facility])   Result Value Ref Range    Cholesterol 250.1 (H) 0.0 - 200.0 mg/dL    Cholesterol/HDL Ratio 5.5 (H) 0.0 - 5.0    HDL Cholesterol 45.4 >40.0 mg/dL    LDL Cholesterol Calculated 160 (H) 0 - 129 mg/dL    Triglycerides 223.0 (H) 0.0 - 150.0 mg/dL    VLDL Cholesterol 44.6 (H) 7.0 - 32.0 mg/dL   Vitamin B12   Result Value Ref Range    Vitamin B12 592 193 - 986 pg/mL   Folate   Result Value Ref Range    Folate 19.0 >5.4 ng/mL       If you have any concerns about these results please call and leave a message for me or send a [employer] message to the clinic.    Sincerely,    Edgar Chappell, DO

## 2019-01-22 ENCOUNTER — OFFICE VISIT (OUTPATIENT)
Dept: FAMILY MEDICINE | Facility: CLINIC | Age: 44
End: 2019-01-22
Payer: COMMERCIAL

## 2019-01-22 VITALS
TEMPERATURE: 98.7 F | WEIGHT: 216.2 LBS | OXYGEN SATURATION: 97 % | BODY MASS INDEX: 32.87 KG/M2 | DIASTOLIC BLOOD PRESSURE: 84 MMHG | HEART RATE: 75 BPM | SYSTOLIC BLOOD PRESSURE: 126 MMHG

## 2019-01-22 DIAGNOSIS — M79.661 PAIN IN BOTH LOWER LEGS: ICD-10-CM

## 2019-01-22 DIAGNOSIS — I10 BENIGN ESSENTIAL HYPERTENSION: ICD-10-CM

## 2019-01-22 DIAGNOSIS — M79.662 PAIN IN BOTH LOWER LEGS: ICD-10-CM

## 2019-01-22 DIAGNOSIS — Z86.79 HISTORY OF ANEURYSM: ICD-10-CM

## 2019-01-22 RX ORDER — GABAPENTIN 100 MG/1
100 CAPSULE ORAL 3 TIMES DAILY
Qty: 90 CAPSULE | Refills: 0 | Status: SHIPPED | OUTPATIENT
Start: 2019-01-22 | End: 2019-02-18

## 2019-01-22 RX ORDER — HYDROCHLOROTHIAZIDE 25 MG/1
25 TABLET ORAL DAILY
Qty: 90 TABLET | Refills: 1 | Status: SHIPPED | OUTPATIENT
Start: 2019-01-22 | End: 2019-06-18

## 2019-01-22 NOTE — NURSING NOTE
Due to patient being non-English speaking/uses sign language, an  was used for this visit. Only for face-to-face interpretation by an external agency, date and length of interpretation can be found on the scanned worksheet.     name: Mehrdad Odom  Agency: [employer]  Language: Iraqi   Telephone number: [phone]  Type of interpretation: Face-to-face, spoken

## 2019-01-22 NOTE — PROGRESS NOTES
HPI       Juanita Salgado is a 44 year old  who presents for   Chief Complaint   Patient presents with     Pain     both legs x4 months     RECHECK     vaginal discomfort x2 weeks     43 yo woman not previously known to me with PMH sig for possible neuropathic leg pain, hypertension, hyperlipidemia, prediabetes. Started on trial of Gabapentin last month and reports good response and would like refill today. Has no new neurologic complaints and per my chart review, there is no clear underlying etiology for her symptom.     In addition, wanted to be evaluated for vaginal discomfort (not pain she says) today but started her period today and does not want exam. Prefers to reschedule for this. Describes 2 weeks of discomfort inside her vagina. No discharge, itch or odor. Not currently sexually active. She is a , all , with last delivery in . Has a hard time explaining what she is feeling, but seems to say that it feels like there is something in her vagina. Denies feeling anything protrude from vagina at rest or with valsalva. Can urinate and stool easily. Has not put anything into vagina, does not use tampons. Never has similar sx in past.    : Anika  +++++++    Problem, Medication and Allergy Lists were reviewed and updated if needed.   Patient Active Problem List   Diagnosis Code     Hypertension, benign essential, goal below 140/90 I10     Cough R05     Prediabetes R73.03     Cerebral aneurysm, nonruptured I67.1     Hyperlipidemia, unspecified hyperlipidemia type E78.5     Furuncle L02.92     Bartholin gland cyst N75.0     Current Outpatient Medications   Medication     acetaminophen (TYLENOL) 500 MG tablet     aspirin (ASA) 81 MG EC tablet     atorvastatin (LIPITOR) 20 MG tablet     Blood Pressure Monitoring (ADULT BLOOD PRESSURE CUFF LG) KIT     ferrous sulfate (FEROSUL) 325 (65 Fe) MG tablet     gabapentin (NEURONTIN) 100 MG capsule     hydrochlorothiazide (HYDRODIURIL) 25 MG tablet      ibuprofen (ADVIL/MOTRIN) 800 MG tablet     mometasone (NASONEX) 50 MCG/ACT spray     order for DME     RESTASIS MULTIDOSE 0.05 % ophthalmic emulsion     No current facility-administered medications for this visit.      Patient is an established patient of this clinic.         Review of Systems:   Review of Systems   Constitutional: Negative.    Respiratory: Negative.    Cardiovascular: Negative.    Genitourinary: Negative for decreased urine volume, difficulty urinating, dysuria, frequency, genital sores, pelvic pain and urgency.   Neurological: Negative for dizziness, tremors, seizures, syncope, facial asymmetry, speech difficulty, weakness, light-headedness and headaches.   Hematological: Negative for adenopathy. Does not bruise/bleed easily.            Physical Exam:     Vitals:    01/22/19 1040 01/22/19 1041   BP: (!) 146/95 126/84   Pulse: 75    Temp: 98.7  F (37.1  C)    TempSrc: Oral    SpO2: 97%    Weight: 98.1 kg (216 lb 3.2 oz)      Body mass index is 32.87 kg/m .  Vitals were reviewed      Physical Exam      Results:       Assessment and Plan        Juanita was seen today for pain and recheck.    Diagnoses and all orders for this visit:    Pain in both lower legs, responsive to Gabapentin.   -    Refill gabapentin (NEURONTIN) 100 MG capsule; Take 1 capsule (100 mg) by mouth 3 times daily  -    Very low vitamin D level 4/18. Would recheck and treat with high dose regimen if indicated  -    Mildly anemic. Consider checking ferritin. Med list does show iron supplement.  -    Symptom not consistent with statin induced myalgia.    Benign essential hypertension  -     Refill hydrochlorothiazide (HYDRODIURIL) 25 MG tablet; Take 1 tablet (25 mg) by mouth daily  -     Not on potassium supplement. Would recheck K+.    History of aneurysm  -     Refill aspirin (ASA) 81 MG EC tablet; Take 1 tablet (81 mg) by mouth daily    Vaginal discomfort        -    Consider pelvic organ prolapse vs other        -    agrees to  RTC when period is over for pelvic exam and labs       There are no discontinued medications.    Options for treatment and follow-up care were reviewed with the patient. Juanita Salgado  engaged in the decision making process and verbalized understanding of the options discussed and agreed with the final plan.    Kelsy Key MD

## 2019-01-22 NOTE — LETTER
RETURN TO SCHOOL FORM    1/22/2019    Re: Juanita Salgado  1975      To Whom It May Concern:     Juanita Salgado was seen in clinic today..  She may return to school without restrictions on 1/22/2019.           Restrictions:  None      Kelsy Key MD  1/22/2019 11:15 AM

## 2019-01-28 ASSESSMENT — ENCOUNTER SYMPTOMS
BRUISES/BLEEDS EASILY: 0
FACIAL ASYMMETRY: 0
FREQUENCY: 0
WEAKNESS: 0
HEADACHES: 0
ADENOPATHY: 0
SPEECH DIFFICULTY: 0
DYSURIA: 0
DIFFICULTY URINATING: 0
SEIZURES: 0
CARDIOVASCULAR NEGATIVE: 1
TREMORS: 0
RESPIRATORY NEGATIVE: 1
LIGHT-HEADEDNESS: 0
DIZZINESS: 0
CONSTITUTIONAL NEGATIVE: 1

## 2019-01-29 ENCOUNTER — OFFICE VISIT (OUTPATIENT)
Dept: FAMILY MEDICINE | Facility: CLINIC | Age: 44
End: 2019-01-29
Payer: COMMERCIAL

## 2019-01-29 VITALS
OXYGEN SATURATION: 98 % | DIASTOLIC BLOOD PRESSURE: 86 MMHG | BODY MASS INDEX: 32.69 KG/M2 | SYSTOLIC BLOOD PRESSURE: 127 MMHG | WEIGHT: 215 LBS | HEART RATE: 78 BPM | RESPIRATION RATE: 14 BRPM

## 2019-01-29 DIAGNOSIS — E55.9 VITAMIN D DEFICIENCY: Primary | ICD-10-CM

## 2019-01-29 DIAGNOSIS — Z13.9 SCREENING FOR CONDITION: ICD-10-CM

## 2019-01-29 DIAGNOSIS — N94.9 VAGINAL DISCOMFORT: ICD-10-CM

## 2019-01-29 DIAGNOSIS — N81.11 CYSTOCELE, MIDLINE: ICD-10-CM

## 2019-01-29 RX ORDER — ERGOCALCIFEROL 1.25 MG/1
50000 CAPSULE, LIQUID FILLED ORAL WEEKLY
Qty: 8 CAPSULE | Refills: 0 | Status: SHIPPED | OUTPATIENT
Start: 2019-01-29 | End: 2019-03-26

## 2019-01-29 ASSESSMENT — ENCOUNTER SYMPTOMS
ABDOMINAL DISTENTION: 0
DIARRHEA: 0
HEMATURIA: 0
CONSTIPATION: 0
CONSTITUTIONAL NEGATIVE: 1
FREQUENCY: 0
DYSURIA: 0
ABDOMINAL PAIN: 0
DIFFICULTY URINATING: 0

## 2019-01-29 NOTE — LETTER
February 5, 2019      Juanita Salgado  [address]  [facility] [address]        Dear Juanita,    Thank you for getting your care at [facility]. Please see below for your test results.  All of your tests are normal. No vaginal infection.    Resulted Orders   Pap imaged thin layer screen with HPV - recommended age 30 - 65 years (select HPV order below)   Result Value Ref Range    PAP NIL     Copath Report         Acc#: [id number]   Signed: 1/31/2019 15:30   MR#: [id number]    SPECIMEN/STAIN PROCESS:  Pap imaged thin layer prep screening (Surepath, FocalPoint with guided   screening)       Pap-Cyto x 1, HPV ordered x 1    SOURCE: Cervical, endocervical  ----------------------------------------------------------------   Pap imaged thin layer prep screening (Surepath, FocalPoint with guided   screening)  SPECIMEN ADEQUACY:  Satisfactory for evaluation.  -Transformation zone component present.    CYTOLOGIC INTERPRETATION:    Negative for intraepithelial lesion or malignancy    Electronically signed by:  AMANDA Gallo (ASCP)    CLINICAL HISTORY:  LMP: 1/19/19  A previous normal pap  Date of Last Pap: 8/26/15,    Papanicolaou Test Limitations:  Cervical cytology is a screening test with   limited sensitivity; regular  screening is critical for cancer prevention; Pap tests are primarily   effective for the diagnosis/prevention of  squamous cell carcinoma, not adenocarcinomas or other cancers.  [facility] LOCATION:  [facility] [address] [address], [phone]  Processed and screened at [facility],   [facility]     HPV High Risk Types DNA Cervical   Result Value Ref Range    HPV Source SurePath     HPV 16 DNA Negative NEG^Negative    HPV 18 DNA Negative NEG^Negative    Other HR HPV Negative NEG^Negative    Final Diagnosis This patient's sample is negative for HPV DNA.       Comment:      This  test was developed and its performance characteristics determined by the   [facility]. It   has not been cleared or approved by the FDA. The laboratory is regulated under   CLIA as qualified to perform high-complexity testing. This test is used for   clinical purposes. It should not be regarded as investigational or for   research.  (Note)  METHODOLOGY:  The Roche amrit 4800 system uses automated extraction,   simultaneous amplification of HPV (L1 region) and beta-globin,    followed by  real time detection of fluorescent labeled HPV and beta   globin using specific oligonucleotide probes . The test specifically   identifies types HPV 16 DNA and HPV 18 DNA while concurrently   detecting the rest of the high risk types (31, 33, 35, 39, 45, 51,   52, 56, 58, 59, 66 or 68).  COMMENTS:  This test is not intended for use as a screening device   for women under age 30 with normal cervical cytology.  Results should   be correl  ated with cytologic and histologic findings. Close clinical   followup is recommended.      Specimen Description Cervical Cells       Comment:      [id number]   NEISSERIA GONORRHOEA PCR   Result Value Ref Range    Specimen Descrip Cervical     N Gonorrhea PCR Negative NEG^Negative      Comment:      Negative for N. gonorrhoeae rRNA by transcription mediated amplification.  A negative result by transcription mediated amplification does not preclude   the presence of N. gonorrhoeae infection because results are dependent on   proper and adequate collection, absence of inhibitors, and sufficient rRNA to   be detected.     CHLAMYDIA TRACHOMATIS PCR   Result Value Ref Range    Specimen Description Cervical     Chlamydia Trachomatis PCR Negative NEG^Negative      Comment:      Negative for C. trachomatis rRNA by transcription mediated amplification.  A negative result by transcription mediated amplification does not preclude   the presence of C.  trachomatis infection because results are dependent on   proper and adequate collection, absence of inhibitors, and sufficient rRNA to   be detected.         If you have any concerns about these results please call and leave a message for me or send a MyChart message to the clinic.    Sincerely,    Kelsy Key MD

## 2019-01-29 NOTE — NURSING NOTE
Due to patient being non-English speaking/uses sign language, an  was used for this visit. Only for face-to-face interpretation by an external agency, date and length of interpretation can be found on the scanned worksheet.     name: betty tolliver  Agency: [employer]  Language: Ukrainian   Telephone number: [phone]  Type of interpretation: Face-to-face, spoken   FANNY Gates

## 2019-01-29 NOTE — PATIENT INSTRUCTIONS
Patient Education     Pelvic Organ Prolapse  Pelvic organ prolapse is when 1 or more organs inside the pelvis slip from their normal places. The pelvis is found between the waist and thighs. Normally, muscles and tissues in the pelvic region support the pelvic organs and hold them in place.  What is a normal pelvis?     Cutaway view of pelvis showing the small intestine, bladder, pubic bone, urethra, pelvic floor muscles, uterus, vagina, and rectum.   A. The small intestine absorbs nutrients from food.  B. The bladder collects and holds urine.  C. The pubic bone helps protect the pelvic organs.  D. The urethra is the tube that carries urine out of the body.  E. The pelvic floor muscles support organs and other structures in the pelvis.  F. The uterus is where the baby develops when a women is pregnant.  G. The vagina is the canal from the uterus to the outside of the body.  H. The rectum stores stool until a bowel movement occurs.  What causes pelvic organ prolapse?   There are several causes of pelvic organ prolapse including:    Vaginal childbirth    Hereditary (genetic) factors    Connective tissue disorders    Getting older    Constant coughing (such as with bronchitis or smoking)    Heavy lifting    Chronic straining (such as with constipation)    Being overweight  What are the symptoms of pelvic organ prolapse?  The symptoms of pelvic organ prolapse include:    A feeling of fullness or pressure in your pelvis    A sense that a ball or lump is sticking out from the vagina    Problems passing urine or having a bowel movement    Urine leakage when you cough or use stairs. (But this can happen even without prolapse.)     Pain or pressure in your low back    Pain when having sex

## 2019-01-29 NOTE — PROGRESS NOTES
HPI       Juanita Salgado is a 44 year old  who presents for   Chief Complaint   Patient presents with     Gyn Exam     Seen last week with complaint of feeling something in her vagina. Declined exam due to period. Here today to complete visit.     History is vague. Notes feeling something soft in her vagina when she puts her fingers in. Not painful but bothers her. Doesn't protrude out of vagina. No discharge, itch or odor. Not sexually active for years. , all . Last delivery in . No h/o abnormal pap. Denies urinary symptoms. More complete ROS below.    A [employer]  was used for  this visit.    +++++++      Problem, Medication and Allergy Lists were reviewed and updated if needed.    Patient is an established patient of this clinic.         Review of Systems:   Review of Systems   Constitutional: Negative.    Gastrointestinal: Negative for abdominal distention, abdominal pain, constipation and diarrhea.   Genitourinary: Negative for decreased urine volume, difficulty urinating, dysuria, frequency, genital sores, hematuria, pelvic pain, urgency, vaginal bleeding and vaginal discharge.            Physical Exam:     Vitals:    19 0906   BP: 127/86   Pulse: 78   Resp: 14   SpO2: 98%   Weight: 97.5 kg (215 lb)     Body mass index is 32.69 kg/m .  Vitals were reviewed      Physical Exam   Constitutional: She appears well-developed and well-nourished. No distress.   Obese     Abdominal: Soft. She exhibits no distension and no mass. There is no tenderness.   Genitourinary:   Genitourinary Comments: Gr 2 circ, accessible urethra and introitus. No discharge or odor. Bladder prolapse palpable in vagina, does not protrude with valsalva. Cervix normal appearing and midline. Nonpregnant sized uterus, no adnexal masses or tenderness. Pap obtained.         Results:   Results are ordered and pending    Assessment and Plan        Juaniat was seen today for gyn exam.    Diagnoses and all orders for this  visit:    Vaginal discomfort  Cystocele, midline  -     [facility], PT, HAND AND CHIROPRACTIC REFERRAL - [facility]; Future for pelvic floor PT    Chronic lower leg pain, B  Vitamin D deficiency, severe  -     Start vitamin D2 (ERGOCALCIFEROL) 22671 units (1250 mcg) capsule; Take 1 capsule (50,000 Units) by mouth once a week    Screening for condition  -     Pap imaged thin layer screen with HPV - recommended age 30 - 65 years (select HPV order below)  -     HPV High Risk Types DNA Cervical    RTC post PT and high dose vit d replacement for recheck         There are no discontinued medications.    Options for treatment and follow-up care were reviewed with the patient. Juanita Salgado  engaged in the decision making process and verbalized understanding of the options discussed and agreed with the final plan.    Kelsy Key MD

## 2019-01-31 LAB
C TRACH DNA SPEC QL NAA+PROBE: NEGATIVE
COPATH REPORT: NORMAL
N GONORRHOEA DNA SPEC QL NAA+PROBE: NEGATIVE
PAP: NORMAL
SPECIMEN SOURCE: NORMAL
SPECIMEN SOURCE: NORMAL

## 2019-02-01 LAB
FINAL DIAGNOSIS: NORMAL
HPV HR 12 DNA CVX QL NAA+PROBE: NEGATIVE
HPV16 DNA SPEC QL NAA+PROBE: NEGATIVE
HPV18 DNA SPEC QL NAA+PROBE: NEGATIVE
SPECIMEN DESCRIPTION: NORMAL
SPECIMEN SOURCE CVX/VAG CYTO: NORMAL

## 2019-02-06 NOTE — LETTER
2019      Juanita Salgado  [address]  [place] [address]         Dear Juanita,      In reviewing your chart we discovered you are due for for the following care:    {Combined Screenin}    We recommend a follow up visit with your provider in the next 2-4 weeks to address your care.  {              DELETE**PLEASE REMOVE TABLE BEFORE SENDING LETTER**DELETE   There are no preventive care reminders to display for this patient.  Patient Active Problem List   Diagnosis     Hypertension, benign essential, goal below 140/90     Cough     Prediabetes     Cerebral aneurysm, nonruptured     Hyperlipidemia, unspecified hyperlipidemia type     Furuncle     Bartholin gland cyst     Screening for condition     Vaginal discomfort     Cystocele, midline     }  If you have had this testing done recently, or are getting your care somewhere else please contact us so that we can update our records.    Please call us at [phone] to schedule an appointment. We care about you and want to help improve your health! Thanks for choosing [facility] for your care.          Sincerely,  [facility]  Team   for Damir Doe MD      Phone: [phone]

## 2019-02-18 ENCOUNTER — TELEPHONE (OUTPATIENT)
Dept: FAMILY MEDICINE | Facility: CLINIC | Age: 44
End: 2019-02-18

## 2019-02-18 DIAGNOSIS — M79.662 PAIN IN BOTH LOWER LEGS: ICD-10-CM

## 2019-02-18 DIAGNOSIS — M79.661 PAIN IN BOTH LOWER LEGS: ICD-10-CM

## 2019-02-18 RX ORDER — GABAPENTIN 100 MG/1
100 CAPSULE ORAL 3 TIMES DAILY
Qty: 90 CAPSULE | Refills: 0 | Status: SHIPPED | OUTPATIENT
Start: 2019-02-18 | End: 2019-07-10

## 2019-04-09 ENCOUNTER — OFFICE VISIT (OUTPATIENT)
Dept: FAMILY MEDICINE | Facility: CLINIC | Age: 44
End: 2019-04-09
Payer: COMMERCIAL

## 2019-04-09 VITALS
DIASTOLIC BLOOD PRESSURE: 84 MMHG | TEMPERATURE: 99 F | OXYGEN SATURATION: 99 % | WEIGHT: 212 LBS | BODY MASS INDEX: 32.23 KG/M2 | HEART RATE: 79 BPM | SYSTOLIC BLOOD PRESSURE: 129 MMHG

## 2019-04-09 DIAGNOSIS — M54.2 NECK PAIN: Primary | ICD-10-CM

## 2019-04-09 DIAGNOSIS — G89.29 CHRONIC RIGHT SHOULDER PAIN: ICD-10-CM

## 2019-04-09 DIAGNOSIS — G44.209 TENSION HEADACHE: ICD-10-CM

## 2019-04-09 DIAGNOSIS — M25.511 CHRONIC RIGHT SHOULDER PAIN: ICD-10-CM

## 2019-04-09 DIAGNOSIS — I67.1 CEREBRAL ANEURYSM, NONRUPTURED: ICD-10-CM

## 2019-04-09 ASSESSMENT — ENCOUNTER SYMPTOMS
ABDOMINAL PAIN: 0
COUGH: 0
NAUSEA: 0
FEVER: 0
CHILLS: 0
DYSURIA: 0
WHEEZING: 0
HEADACHES: 1
NECK STIFFNESS: 0
ARTHRALGIAS: 0
SEIZURES: 0
CONFUSION: 0
SPEECH DIFFICULTY: 0
FATIGUE: 1
UNEXPECTED WEIGHT CHANGE: 0
AGITATION: 0
WEAKNESS: 0
NECK PAIN: 1
DIARRHEA: 0
SHORTNESS OF BREATH: 0
VOMITING: 0
DIZZINESS: 0
SORE THROAT: 0

## 2019-04-09 NOTE — NURSING NOTE
Due to patient being non-English speaking/uses sign language, an  was used for this visit. Only for face-to-face interpretation by an external agency, date and length of interpretation can be found on the scanned worksheet.     name: Cecilia Hernandez  Agency: [employer]  Language: Estonian   Telephone number: [phone]  Type of interpretation: Face-to-face, spoken

## 2019-04-09 NOTE — PATIENT INSTRUCTIONS
Here is the plan from today's visit    1. Neck pain  2. Chronic right shoulder pain  Appears to be muscular pain, worst at your neck and shoulder muscles.  Can try acupuncture, trigger point injections, OMT, physical therapy as we discussed.  You chose to try physical therapy and OMT at [facility]    - Please make appointment with [facility] as below for therapy  - Please make appointment for OMT with DO physician    [facility]    [address], [address]   [place] [address]   Phone: [phone]      Please call or return to clinic if your symptoms don't go away.    Follow up plan  Please make a clinic appointment for follow up with me (BLADE RUIZ) in 2-4 weeks if not improving.    Thank you for coming to [facility] today.  Lab Testing:  **If you had lab testing today and your results are reassuring or normal they will be mailed to you or sent through [facility] within 7 days.   **If the lab tests need quick action we will call you with the results.  The phone number we will call with results is # [phone] (home) . If this is not the best number please call our clinic and change the number.  Medication Refills:  If you need any refills please call your pharmacy and they will contact us.   If you need to  your refill at a new pharmacy, please contact the new pharmacy directly. The new pharmacy will help you get your medications transferred faster.   Scheduling:  If you have any concerns about today's visit or wish to schedule another appointment please call our office during normal business hours [phone] (8-5:00 M-F)  If a referral was made to a [facility] and you don't get a call from central scheduling please call [phone].  If a Mammogram was ordered for you at [facility] call [phone] to schedule or change your appointment.  If you had an XRay/CT/Ultrasound/MRI ordered the number is [phone] to  schedule or change your radiology appointment.   Medical Concerns:  If you have urgent medical concerns please call [phone] at any time of the day.    En Frias MD    PT referral for [facility]  Referral faxed to [phone], they will review and contact patient directly to schedule

## 2019-04-09 NOTE — PROGRESS NOTES
Preceptor Attestation:   Patient seen, evaluated and discussed with the resident. I have verified the content of the note, which accurately reflects my assessment of the patient and the plan of care.   Supervising Physician:  Roseann Fair MD

## 2019-04-09 NOTE — PROGRESS NOTES
IVETH       Juanita Salgado is a 44 year old  who presents for   Chief Complaint   Patient presents with     Pain     neck and shoulder right side, ongoing     Neck/Shoulder pain, R side:  Started 6 months ago, no inciting event known, no trauma history.  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  Is able to lift arms above head but does have some increase pain.    No weakness and no tingling/numbness radiating down arm.  Reports neck pain is worst and this radiates to her shoulder.  Has history of tensio headaches in chart.  Discussed if any increased stressors, she denied any changes or concerns.    Therapies tried:  Massage from family members, tylenol and ibuprofen    MCA aneurysm: had repeat imaging at [facility] March 2019, no changes, repeat imaging in 5 years.  She does report that her current neck pain sometimes seem to radiate up side of face to temple.  No change in vision or focal deficits noted.   Does report 'eyes get tired more easily' but is bilateral.  Has been taking aspirin and ibuprofen and blood pressure medication as directed she states, refills available.  Blood pressures have been controlled at home and today in clinic.    A [employer]  was used for  this visit.    +++++++    Problem, Medication and Allergy Lists were reviewed and updated if needed..    Patient is an established patient of this clinic..         Review of Systems:   Review of Systems   Constitutional: Positive for fatigue. Negative for chills, fever and unexpected weight change.   HENT: Negative for congestion and sore throat.    Eyes: Negative for visual disturbance.   Respiratory: Negative for cough, shortness of breath and wheezing.    Cardiovascular: Negative for chest pain and leg swelling.   Gastrointestinal: Negative for abdominal pain, diarrhea, nausea and vomiting.   Genitourinary: Negative for dysuria and vaginal discharge.   Musculoskeletal: Positive for neck pain. Negative  for arthralgias and neck stiffness.   Skin: Negative for rash.   Neurological: Positive for headaches. Negative for dizziness, seizures, syncope, speech difficulty and weakness.   Psychiatric/Behavioral: Negative for agitation and confusion.   All other systems reviewed and are negative.           Physical Exam:     Vitals:    04/09/19 1335   BP: 129/84   Pulse: 79   Temp: 99  F (37.2  C)   TempSrc: Oral   SpO2: 99%   Weight: 96.2 kg (212 lb)     Body mass index is 32.23 kg/m .  Vitals were reviewed and were normal     Physical Exam   Constitutional: She is oriented to person, place, and time. She appears well-developed and well-nourished. No distress.   HENT:   Head: Normocephalic and atraumatic.   Eyes: Pupils are equal, round, and reactive to light. Right eye exhibits no discharge. Left eye exhibits no discharge.   Neck: Muscular tenderness present. No spinous process tenderness present. No neck rigidity. No edema, no erythema and normal range of motion present. No Kernig's sign noted.       Tenderness along R para-cervical musculature, tension and tenderness along trapezius and sternocleidomastoid.  FROM but some limitation in head rotation due to muscular tension.  Spurling's negative.  Strength intact at bilateral arms and rotator cuff.   Cardiovascular: Normal rate and regular rhythm.   Pulmonary/Chest: Effort normal and breath sounds normal. No respiratory distress.   Musculoskeletal:        Right shoulder: She exhibits tenderness (muscular). She exhibits normal range of motion and no bony tenderness.   Neurological: She is alert and oriented to person, place, and time. No cranial nerve deficit.   Skin: Skin is warm and dry. She is not diaphoretic.   Psychiatric: Her behavior is normal. Thought content normal.       Results:   No testing ordered today    Assessment and Plan        1. Neck pain  Appears muscular in nature, has tension and tenderness in para-cervical muscles (R side) including trapezius and  sternocleidomastoid.  No bony tenderness, strength intact, FROM, no weakness or radiations into arm.  No inciting event, no change in stress levels she reports, although did state she was feeling fatigued and wished to return to discuss further.  Offered trigger point injections, PT, OMT, acupuncture, patient opted for PT and will schedule an OMT visit. Has tylenol she will continue to use prn.  No focal deficits, no tenderness along temporal region today, no signs concerning for worsening aneurysm, headaches are mild and she feels occur after neck pain.  - PHYSICAL THERAPY REFERRAL - EXTERNAL; Future    3. MCA, R temporal aneurysm  Repeat imaging planned in 5 years, MRA in march 2019 at [facility] showed no changes.  She is taking aspirin 81 mg daily, lipitor 20 mg daily, and targeting tighter blood pressure control (on hydrochlorothiazide).  - Will return in 2 weeks to discuss if any blood pressure concerns, was normotensive today.  - Her current neck pain and R temporal pain/headache are not likely due to her aneurysm.       There are no discontinued medications.    Options for treatment and follow-up care were reviewed with the patient. Juanita Salgado  engaged in the decision making process and verbalized understanding of the options discussed and agreed with the final plan.    En Frias MD

## 2019-05-21 NOTE — PATIENT INSTRUCTIONS
Here is the plan from today's visit    1. Irritant contact dermatitis due to cosmetics  - hydrocortisone 1 % ointment; Apply sparingly to affected area three times daily for 14 days.  Dispense: 30 g; Refill: 0    2. Chronic pelvic pain in female  - OB/GYN REFERRAL        Please call or return to clinic if your symptoms don't go away.    Follow up plan  Follow up in 3-4 weeks for hypertension and labs    Thank you for coming to [facility] today.  Lab Testing:  **If you had lab testing today and your results are reassuring or normal they will be mailed to you or sent through [employer] within 7 days.   **If the lab tests need quick action we will call you with the results.  The phone number we will call with results is # [phone] (home) . If this is not the best number please call our clinic and change the number.  Medication Refills:  If you need any refills please call your pharmacy and they will contact us.   If you need to  your refill at a new pharmacy, please contact the new pharmacy directly. The new pharmacy will help you get your medications transferred faster.   Scheduling:  If you have any concerns about today's visit or wish to schedule another appointment please call our office during normal business hours [phone] (8-5:00 M-F)  If a referral was made to a [facility] and you don't get a call from central scheduling please call [phone].  If a Mammogram was ordered for you at [facility] call [phone] to schedule or change your appointment.  If you had an XRay/CT/Ultrasound/MRI ordered the number is [phone] to schedule or change your radiology appointment.   Medical Concerns:  If you have urgent medical concerns please call [phone] at any time of the day.  If you have a medical emergency please call [phone].   irregular heart rate+

## 2019-06-18 ENCOUNTER — OFFICE VISIT (OUTPATIENT)
Dept: FAMILY MEDICINE | Facility: CLINIC | Age: 44
End: 2019-06-18
Payer: COMMERCIAL

## 2019-06-18 VITALS
HEIGHT: 66 IN | DIASTOLIC BLOOD PRESSURE: 87 MMHG | BODY MASS INDEX: 32.3 KG/M2 | HEART RATE: 82 BPM | WEIGHT: 201 LBS | OXYGEN SATURATION: 98 % | RESPIRATION RATE: 16 BRPM | SYSTOLIC BLOOD PRESSURE: 133 MMHG | TEMPERATURE: 98.5 F

## 2019-06-18 DIAGNOSIS — I10 BENIGN ESSENTIAL HYPERTENSION: ICD-10-CM

## 2019-06-18 DIAGNOSIS — L03.115 CELLULITIS OF RIGHT LOWER EXTREMITY: Primary | ICD-10-CM

## 2019-06-18 RX ORDER — ACETAMINOPHEN 325 MG/1
325-650 TABLET ORAL EVERY 6 HOURS PRN
Qty: 100 TABLET | Refills: 3 | Status: SHIPPED | OUTPATIENT
Start: 2019-06-18 | End: 2019-07-10

## 2019-06-18 RX ORDER — HYDROCHLOROTHIAZIDE 25 MG/1
25 TABLET ORAL DAILY
Qty: 90 TABLET | Refills: 1 | Status: SHIPPED | OUTPATIENT
Start: 2019-06-18

## 2019-06-18 RX ORDER — SULFAMETHOXAZOLE/TRIMETHOPRIM 800-160 MG
1 TABLET ORAL 2 TIMES DAILY
Qty: 10 TABLET | Refills: 0 | Status: SHIPPED | OUTPATIENT
Start: 2019-06-18 | End: 2019-06-23

## 2019-06-18 ASSESSMENT — MIFFLIN-ST. JEOR: SCORE: 1585.73

## 2019-06-18 ASSESSMENT — PAIN SCALES - GENERAL: PAINLEVEL: SEVERE PAIN (6)

## 2019-06-18 NOTE — PATIENT INSTRUCTIONS
Here is the plan from today's visit    1. Cellulitis of right lower extremity  - sulfamethoxazole-trimethoprim (BACTRIM DS/SEPTRA DS) 800-160 MG tablet; Take 1 tablet by mouth 2 times daily for 5 days  Dispense: 10 tablet; Refill: 0  - acetaminophen (TYLENOL) 325 MG tablet; Take 1-2 tablets (325-650 mg) by mouth every 6 hours as needed for mild pain  Dispense: 100 tablet; Refill: 3    2. Benign essential hypertension  - hydrochlorothiazide (HYDRODIURIL) 25 MG tablet; Take 1 tablet (25 mg) by mouth daily  Dispense: 90 tablet; Refill: 1    Please call or return to clinic if your symptoms don't go away.    Follow up plan  Please make a clinic appointment for follow up with me (HAYLIE NARAYAN) in 2  weeks for recheck.    Thank you for coming to [facility] today.  Lab Testing:  **If you had lab testing today and your results are reassuring or normal they will be mailed to you or sent through [employer] within 7 days.   **If the lab tests need quick action we will call you with the results.  The phone number we will call with results is # [phone] (home) . If this is not the best number please call our clinic and change the number.  Medication Refills:  If you need any refills please call your pharmacy and they will contact us.   If you need to  your refill at a new pharmacy, please contact the new pharmacy directly. The new pharmacy will help you get your medications transferred faster.   Scheduling:  If you have any concerns about today's visit or wish to schedule another appointment please call our office during normal business hours [phone] (8-5:00 M-F)  If a referral was made to a [facility] and you don't get a call from central scheduling please call [phone].  If a Mammogram was ordered for you at [facility] call [phone] to schedule or change your appointment.  If you had an XRay/CT/Ultrasound/MRI ordered the number is [phone] to schedule or change your  radiology appointment.   Medical Concerns:  If you have urgent medical concerns please call [phone] at any time of the day.    Jessica Mccoy, DO

## 2019-06-18 NOTE — PROGRESS NOTES
"       IVETH Salgado is a 44 year old  who presents for   Chief Complaint   Patient presents with     Musculoskeletal Problem     patient complaining of right leg pain x3dys      Rash/Lesion  Onset: May 25 or 26    Description:   Location: right anterior shin  Started out as a pimple like lesion, then popped it and had pustular discharge - last time of discharge was end of last week. Now she has burning pain and some swelling. Never saw a doctor for it and no antibiotics. Was taking only ibuprofen for pain control. Denies any fevers or body aches, no joint pain. At the beginning felt fatigued and subjective fever, not any more. Is having swelling in left ankle, only on the right foot, not on the left. Has never had swelling before, only with this lesion. Never had any injury or bug bite before.    +++++++    Problem, Medication and Allergy Lists were reviewed and updated if needed..    Patient is an established patient of this clinic..         Review of Systems:   Review of Systems         Physical Exam:     Vitals:    06/18/19 1334   BP: 133/87   Pulse: 82   Resp: 16   Temp: 98.5  F (36.9  C)   TempSrc: Oral   SpO2: 98%   Weight: 91.2 kg (201 lb)   Height: 1.68 m (5' 6.14\")     Body mass index is 32.3 kg/m .  Vitals were reviewed and were normal         Physical Exam   Constitutional: She appears well-developed and well-nourished. No distress.   Cardiovascular: Normal rate.   Pulmonary/Chest: Effort normal.   Musculoskeletal: She exhibits edema and tenderness.   Neurological: She is alert.   Skin: Skin is warm. She is not diaphoretic. There is erythema.   Vitals reviewed.      Results:   No testing ordered today    Assessment and Plan        1. Cellulitis of right lower extremity  History and physical exam concerning for cellulitis.  Discussed with patient about causes of cellulitis, most likely a as patient described having a pimple-like lesion prior, and then she pustule, caused a disruption in the skin, " leading to cellulitis.  Will treat with antibiotics at this time.  Discussed with patient that symptoms should improve after taking the antibiotics.  if she continues to have swelling, redness, warmth, tenderness, despite taking the antibiotic, she should contact us right away and present for further evaluation.  Did not obtain a culture today, as patient has not had a previous episode of this.   - sulfamethoxazole-trimethoprim (BACTRIM DS/SEPTRA DS) 800-160 MG tablet; Take 1 tablet by mouth 2 times daily for 5 days  Dispense: 10 tablet; Refill: 0  - acetaminophen (TYLENOL) 325 MG tablet; Take 1-2 tablets (325-650 mg) by mouth every 6 hours as needed for mild pain  Dispense: 100 tablet; Refill: 3    2. Benign essential hypertension  Refill given  - hydrochlorothiazide (HYDRODIURIL) 25 MG tablet; Take 1 tablet (25 mg) by mouth daily  Dispense: 90 tablet; Refill: 1     Medications Discontinued During This Encounter   Medication Reason     ibuprofen (ADVIL/MOTRIN) 800 MG tablet      hydrochlorothiazide (HYDRODIURIL) 25 MG tablet Reorder       Options for treatment and follow-up care were reviewed with the patient. Juanita Salgado  engaged in the decision making process and verbalized understanding of the options discussed and agreed with the final plan.    Jessica Mccoy DO

## 2019-07-10 ENCOUNTER — OFFICE VISIT (OUTPATIENT)
Dept: FAMILY MEDICINE | Facility: CLINIC | Age: 44
End: 2019-07-10
Payer: COMMERCIAL

## 2019-07-10 VITALS
SYSTOLIC BLOOD PRESSURE: 120 MMHG | WEIGHT: 209.6 LBS | BODY MASS INDEX: 33.68 KG/M2 | OXYGEN SATURATION: 100 % | RESPIRATION RATE: 16 BRPM | HEART RATE: 82 BPM | HEIGHT: 66 IN | DIASTOLIC BLOOD PRESSURE: 81 MMHG | TEMPERATURE: 98.2 F

## 2019-07-10 DIAGNOSIS — M79.661 PAIN IN BOTH LOWER LEGS: ICD-10-CM

## 2019-07-10 DIAGNOSIS — R73.03 PREDIABETES: Primary | ICD-10-CM

## 2019-07-10 DIAGNOSIS — M79.662 PAIN IN BOTH LOWER LEGS: ICD-10-CM

## 2019-07-10 DIAGNOSIS — L03.115 CELLULITIS OF RIGHT LOWER EXTREMITY: ICD-10-CM

## 2019-07-10 LAB — HBA1C MFR BLD: 5.6 % (ref 4.1–5.7)

## 2019-07-10 RX ORDER — GABAPENTIN 100 MG/1
100 CAPSULE ORAL 3 TIMES DAILY
Qty: 270 CAPSULE | Refills: 0 | Status: SHIPPED | OUTPATIENT
Start: 2019-07-10

## 2019-07-10 RX ORDER — ACETAMINOPHEN 325 MG/1
325-650 TABLET ORAL EVERY 6 HOURS PRN
Qty: 100 TABLET | Refills: 3 | Status: SHIPPED | OUTPATIENT
Start: 2019-07-10

## 2019-07-10 RX ORDER — MULTIPLE VITAMINS W/ MINERALS TAB 9MG-400MCG
1 TAB ORAL DAILY
COMMUNITY

## 2019-07-10 ASSESSMENT — ENCOUNTER SYMPTOMS
MYALGIAS: 0
ARTHRALGIAS: 0
POLYDIPSIA: 0
ABDOMINAL PAIN: 0
HEADACHES: 0
SORE THROAT: 0
DYSURIA: 0
SHORTNESS OF BREATH: 0
NAUSEA: 0
DIARRHEA: 0
FEVER: 0
COUGH: 0
RHINORRHEA: 0
VOMITING: 0
CHILLS: 0
CONSTIPATION: 0
LIGHT-HEADEDNESS: 0

## 2019-07-10 ASSESSMENT — MIFFLIN-ST. JEOR: SCORE: 1622.49

## 2019-07-10 NOTE — PATIENT INSTRUCTIONS
Here is the plan from today's visit    1. Pain in both lower legs  Description of pain consistent with peripheral neuropathy.  Low dose gabapentin worked well in the past.  Will refill medication.  If pain persists return to clinic to discuss dosage increase or other testing.  No significant polyuria or polydipsia but with prediabtes will recheck a1c ad DM could cause worsening neuropathy  - gabapentin (NEURONTIN) 100 MG capsule; Take 1 capsule (100 mg) by mouth 3 times daily  Dispense: 270 capsule; Refill: 0  - Hemoglobin A1c ([facility])    2. Prediabetes    - Hemoglobin A1c ([facility])      Please call or return to clinic if your symptoms don't go away.    Follow up plan  Please make a clinic appointment for follow up with your primary physician Jessica Mccoy DO in 3 months    Thank you for coming to [facility] today.  Lab Testing:  **If you had lab testing today and your results are reassuring or normal they will be mailed to you or sent through [employer] within 7 days.   **If the lab tests need quick action we will call you with the results.  The phone number we will call with results is # [phone] (home) . If this is not the best number please call our clinic and change the number.  Medication Refills:  If you need any refills please call your pharmacy and they will contact us.   If you need to  your refill at a new pharmacy, please contact the new pharmacy directly. The new pharmacy will help you get your medications transferred faster.   Scheduling:  If you have any concerns about today's visit or wish to schedule another appointment please call our office during normal business hours [phone] (8-5:00 M-F)  If a referral was made to a [facility] and you don't get a call from central scheduling please call [phone].  If a Mammogram was ordered for you at [facility] call [phone] to schedule or change your appointment.  If you had an XRay/CT/Ultrasound/MRI  ordered the number is [phone] to schedule or change your radiology appointment.   Medical Concerns:  If you have urgent medical concerns please call [phone] at any time of the day.    Damir Doe MD

## 2019-07-10 NOTE — PROGRESS NOTES
"       IVETH Salgado is a 44 year old  who presents for   Chief Complaint   Patient presents with     Musculoskeletal Problem     Both of her legs are painful only from the knee down and the left leg is worse. Also has burning in both feet.  It happened a year ago and got better but the last 2 weeks have been very painful.     Patient is a 44 year old female who presents for \"nerve pain\" in both legs.  She says it started 1 year ago but then it got better, but it has recurred severe over the last 2 weeks.  1 year ago she received a medication that she took for 2 months and she was cupping, both of which she feels helped and made it go away completely.  She says she cannot walk and the pain is \"burning\".  She says pain is worse with walking.  Pain is below the knees, bilateral.  Left is worse than right.          A [employer]  was used for  this visit.    +++++++      Problem, Medication and Allergy Lists were reviewed and updated if needed..    Patient is an established patient of this clinic..         Review of Systems:   Review of Systems   Constitutional: Negative for chills and fever.   HENT: Negative for rhinorrhea and sore throat.    Respiratory: Negative for cough and shortness of breath.    Cardiovascular: Negative for chest pain.   Gastrointestinal: Negative for abdominal pain, constipation, diarrhea, nausea and vomiting.   Endocrine: Negative for polydipsia and polyuria.   Genitourinary: Negative for dysuria.   Musculoskeletal: Negative for arthralgias and myalgias.   Neurological: Negative for light-headedness and headaches.            Physical Exam:     Vitals:    07/10/19 0926   BP: 120/81   BP Location: Left arm   Patient Position: Sitting   Cuff Size: Adult Regular   Pulse: 82   Resp: 16   Temp: 98.2  F (36.8  C)   TempSrc: Oral   SpO2: 100%   Weight: 95.1 kg (209 lb 9.6 oz)   Height: 1.676 m (5' 6\")     Body mass index is 33.83 kg/m .  Vitals were reviewed and were normal     Physical " Exam   Constitutional: She is oriented to person, place, and time. She appears well-developed and well-nourished. No distress.   HENT:   Head: Normocephalic.   Eyes: Conjunctivae are normal.   Neck: Neck supple.   Cardiovascular: Normal rate.   Pulmonary/Chest: Effort normal. No respiratory distress.   Musculoskeletal: She exhibits no deformity.   Neurological: She is alert and oriented to person, place, and time. She displays normal reflexes. No sensory deficit. She exhibits normal muscle tone.   Light touch and vibration normal bilateral feet   Skin: Skin is warm and dry. Capillary refill takes less than 2 seconds.   Psychiatric: She has a normal mood and affect.         Results:       Assessment and Plan          1. Pain in both lower legs  Description of pain consistent with peripheral neuropathy.  Low dose gabapentin worked well in the past.  Will refill medication.  If pain persists return to clinic to discuss dosage increase or other testing.  No significant polyuria or polydipsia but with prediabtes will recheck a1c ad DM could cause worsening neuropathy  - gabapentin (NEURONTIN) 100 MG capsule; Take 1 capsule (100 mg) by mouth 3 times daily  Dispense: 270 capsule; Refill: 0  - Hemoglobin A1c ([facility])    2. Prediabetes    - Hemoglobin A1c ([facility])      Please call or return to clinic if your symptoms don't go away.    Follow up plan  Please make a clinic appointment for follow up with your primary physician Jessica Mccoy DO in 3 months             There are no discontinued medications.    Options for treatment and follow-up care were reviewed with the patient. Juanita Salgado  engaged in the decision making process and verbalized understanding of the options discussed and agreed with the final plan.    Damir Doe MD

## 2019-07-10 NOTE — NURSING NOTE
Due to patient being non-English speaking/uses sign language, an  was used for this visit. Only for face-to-face interpretation by an external agency, date and length of interpretation can be found on the scanned worksheet.     name: Ele Mendoza  Language: Welsh   Agency: [employer]   Phone number: [phone]  Type of interpretation: Face-to-face, spoken      Brisa Bundy CMA on 7/10/2019 at 9:25 AM

## 2019-07-23 ENCOUNTER — OFFICE VISIT (OUTPATIENT)
Dept: FAMILY MEDICINE | Facility: CLINIC | Age: 44
End: 2019-07-23
Payer: COMMERCIAL

## 2019-07-23 ENCOUNTER — ANCILLARY PROCEDURE (OUTPATIENT)
Dept: GENERAL RADIOLOGY | Facility: CLINIC | Age: 44
End: 2019-07-23
Attending: FAMILY MEDICINE
Payer: COMMERCIAL

## 2019-07-23 VITALS
RESPIRATION RATE: 16 BRPM | HEART RATE: 72 BPM | TEMPERATURE: 98.8 F | WEIGHT: 208.8 LBS | DIASTOLIC BLOOD PRESSURE: 90 MMHG | HEIGHT: 66 IN | BODY MASS INDEX: 33.56 KG/M2 | SYSTOLIC BLOOD PRESSURE: 133 MMHG | OXYGEN SATURATION: 100 %

## 2019-07-23 DIAGNOSIS — M25.562 CHRONIC PAIN OF LEFT KNEE: Primary | ICD-10-CM

## 2019-07-23 DIAGNOSIS — M25.561 ACUTE PAIN OF RIGHT KNEE: ICD-10-CM

## 2019-07-23 DIAGNOSIS — M25.562 CHRONIC PAIN OF LEFT KNEE: ICD-10-CM

## 2019-07-23 DIAGNOSIS — G89.29 CHRONIC PAIN OF LEFT KNEE: Primary | ICD-10-CM

## 2019-07-23 DIAGNOSIS — G89.29 CHRONIC PAIN OF LEFT KNEE: ICD-10-CM

## 2019-07-23 RX ORDER — TRIAMCINOLONE ACETONIDE 40 MG/ML
40 INJECTION, SUSPENSION INTRA-ARTICULAR; INTRAMUSCULAR ONCE
Status: COMPLETED | OUTPATIENT
Start: 2019-07-23 | End: 2019-07-23

## 2019-07-23 RX ADMIN — TRIAMCINOLONE ACETONIDE 40 MG: 40 INJECTION, SUSPENSION INTRA-ARTICULAR; INTRAMUSCULAR at 10:27

## 2019-07-23 ASSESSMENT — MIFFLIN-ST. JEOR: SCORE: 1621.11

## 2019-07-23 NOTE — NURSING NOTE
name: Ele Mendoza  Language: Egyptian   Agency: [employer]   Phone number: [phone]  Alysa Figueroa CMA

## 2019-07-23 NOTE — PATIENT INSTRUCTIONS
Here is the plan from today's visit    1. Chronic pain of left knee  Knee injection today. Physical therapy for strengthening and stretching. Follow up in 8 weeks after start of physical therapy to reassess knee. Referral to nutrition to assist in weight loss.   - XR Knee Bilateral 3 Views; Future  - [facility], PT, HAND AND CHIROPRACTIC REFERRAL - [facility]; Future  - NUTRITION REFERRAL - INTERNAL    2. Acute pain of right knee  - XR Knee Bilateral 3 Views; Future      Please call or return to clinic if your symptoms don't go away.    Follow up plan  Please make an appointment and follow up with Dr. Ella Fair for a visit in 8 weeks after start of PT for recheck of knee.     Thank you for coming to [facility] today.  Lab Testing:  **If you had lab testing today and your results are reassuring or normal they will be mailed to you or sent through [employer] within 7 days.   **If the lab tests need quick action we will call you with the results.  The phone number we will call with results is # [phone] (home) . If this is not the best number please call our clinic and change the number.  Medication Refills:  If you need any refills please call your pharmacy and they will contact us.   If you need to  your refill at a new pharmacy, please contact the new pharmacy directly. The new pharmacy will help you get your medications transferred faster.   Scheduling:  If you have any concerns about today's visit or wish to schedule another appointment please call our office during normal business hours [phone] (8-5:00 M-F)  If a referral was made to a [facility] and you don't get a call from central scheduling please call [phone].  If a Mammogram was ordered for you at [facility] call [phone] to schedule or change your appointment.  If you had an XRay/CT/Ultrasound/MRI ordered the number is [phone] to schedule or change your radiology appointment.   Medical Concerns:  If you  have urgent medical concerns please call [phone] at any time of the day.    Roseann Fair MD

## 2019-07-23 NOTE — PROGRESS NOTES
"SUBJECTIVE: Juanita Salgado is a 44 year old female who presents for 2 months of left knee pain. Pain is described as a burning pain on the lateral and medial joint lines with occasional sharp shooting pain from knee to the dorsal aspect of her foot, and is rated at 8/10. Pain is increased with walking, kneeling, and will be stiff after sitting for an extended period of time. She also has had 7 episodes in the last 2 months where she feels her knee has locked. She also reports knee swelling, and elevating the knee helps bring this down. She denies any injury to the knee. She has used tylenol, ice packs, and heat packs for pain control, but this has not helped pain.  She has not tried physical therapy. Knee is not red or hot. She denies any other symptoms and has otherwise felt well.     PAST MEDICAL, SOCIAL, SURGICAL AND FAMILY HISTORY: She  has a past medical history of Hypertension. She also has no past medical history of Arthritis, Cancer (H), Congestive heart failure, unspecified, COPD (chronic obstructive pulmonary disease) (H), Coronary artery disease, CVA (cerebral infarction), Depressive disorder, Diabetes (H), History of blood transfusion, Thyroid disease, or Uncomplicated asthma.  She  has no past surgical history on file.  Her family history is not on file.  She reports that she has never smoked. She has never used smokeless tobacco. She reports that she does not drink alcohol or use drugs.      ALLERGIES: She is allergic to seasonal allergies.    CURRENT MEDICATIONS: She has a current medication list which includes the following prescription(s): acetaminophen, aspirin, ferrous sulfate, gabapentin, hydrochlorothiazide, mometasone, restasis multidose, acetaminophen, atorvastatin, and multivitamin w/minerals.     REVIEW OF SYSTEMS: 10 point review of systems is negative except as noted above.    EXAM:  /90   Pulse 72   Temp 98.8  F (37.1  C) (Oral)   Resp 16   Ht 1.68 m (5' 6.14\")   Wt 94.7 kg (208 " lb 12.8 oz)   LMP 07/04/2019 (Approximate)   SpO2 100%   BMI 33.56 kg/m    CONSTITUTIONIAL: healthy, alert and no distress  HEAD: Normocephalic. No masses, lesions, tenderness or abnormalities  SKIN: no suspicious lesions or rashes  GAIT: antalgic  Stance: normal  NEUROLOGIC: Normal muscle tone and strength, reflexes normal, sensation grossly normal. Unable to perform toe and heel walk on the left.   PSYCHIATRIC: affect normal/bright and mentation appears normal.    MUSCULOSKELETAL: left knee  Inspection: genu varum, moderate effusion  Tender: medial femoral condyle, lateral femoral condyle, popliteal fossa.  Non-tender: lateral patellar facet, medial patellar facet, inferior pole patella, patella tendon, medial tibial plateau, lateral tibial plateau, pes anserine bursa  Active Range of Motion: pain with flexion, full extension, no pain with extension, Patellofemoral crepitus with extension  Strength: quad  5-/5, 4-/5 left   Special tests: positive pain with Valgus stress test, normal Varus, negative Lachman's test, negative posterior drawer, negative Gustavo's , no apprehension with lateral stress of the patella, pain with Lachman's.     Also examined: right knee, normal.     PROCEDURE:  JOINT ASPIRATION/INJECTION    After a discussion of risks, benefits and side effects of procedure, informed patient consent was obtained.  The left knee was prepped and draped in the usual clean fashion (sterile not required for this procedure).  Ethyl chloride was used for local analgesia.     ASPIRATION: Not performed.    INJECTION:  Using 4 cc of 1 % lidocaine mixed with 40 mg of Kenalog, the left knee was successfully injected without complication.  Patient did experience some pain relief following injection.    Invasive Procedure Safety Checklist completed by nurse? N/A      ASSESSMENT/PLAN:  Juanita was seen today for bilateral knee pain, L>R.    Diagnoses and all orders for this visit:    Chronic pain of left knee- Patellar  OA  X-rays completed today of the knees as there are no previous imaging studies. We discussed the importance of PT exercises and strengthening, as well as weight loss. She agrees to referral for weight loss assistance. Injection of the left knee completed today. She is instructed to return 8 weeks after she begins PT to reassess the knee pain.  -     XR Knee Bilateral 3 Views; Future  -     [facility], PT, HAND AND CHIROPRACTIC REFERRAL - [facility]; Future  -     NUTRITION REFERRAL - INTERNAL  -     Large Joint/Bursa injection and/or drainage - Unilateral (Shoulder, Knee) [20610]  -     triamcinolone (KENALOG-40) injection 40 mg    Acute pain of right knee  -     XR Knee Bilateral 3 Views; Future    X-RAY INTERPRETATION:   X-Ray of the bilateral Knee: 3-view, [place], lateral, sunrise   ordered and interpreted in the office today was positive for mild narrowing in the medial femorotibial joint compartment of both knees. Mild lateral patellar tilt in both knees.       Mounika Christopher MD     Patient seen, evaluated and discussed with the resident. I have verified the content of the note, which accurately reflects my assessment of the patient and the plan of care.  I was present for the left knee injection.   Supervising Physician:  Roseann Fair MD

## 2019-07-23 NOTE — NURSING NOTE
Due to patient being non-English speaking/uses sign language, an  was used for this visit. Only for face-to-face interpretation by an external agency, date and length of interpretation can be found on the scanned worksheet.     name: Madhavi ID # [id number]  Agency: [employer]  Language: Fijian   Telephone number: n/a  Type of interpretation: Telemedicine, spoken       - Both an in person interpeter and the ipad were used due to the  having to leave.    Alysa Figueroa, CMA

## 2019-08-07 ENCOUNTER — THERAPY VISIT (OUTPATIENT)
Dept: PHYSICAL THERAPY | Facility: CLINIC | Age: 44
End: 2019-08-07
Attending: FAMILY MEDICINE
Payer: COMMERCIAL

## 2019-08-07 DIAGNOSIS — G89.29 CHRONIC PAIN OF LEFT KNEE: ICD-10-CM

## 2019-08-07 DIAGNOSIS — M25.562 CHRONIC PAIN OF LEFT KNEE: ICD-10-CM

## 2019-08-07 PROCEDURE — 97161 PT EVAL LOW COMPLEX 20 MIN: CPT | Mod: GP | Performed by: PHYSICAL THERAPIST

## 2019-08-07 PROCEDURE — 97110 THERAPEUTIC EXERCISES: CPT | Mod: GP | Performed by: PHYSICAL THERAPIST

## 2019-08-07 PROCEDURE — 97112 NEUROMUSCULAR REEDUCATION: CPT | Mod: GP | Performed by: PHYSICAL THERAPIST

## 2019-08-07 NOTE — PROGRESS NOTES
[facility] Initial Evaluation  Subjective:  \[facility]\""                  [facility]  Physical Therapy Initial Examination/Evaluation  August 7, 2019    Juanita Salgado is a 44 year old  female referred to physical therapy by Dr. Fair for treatment of knee pain with Precautions/Restrictions/MD instructions none    KEY PT FINDINGS:  1.  Proximal glute and core stability weakness  2.  Fair quadriceps strength  3.  Poor proprioception    Subjective:  Referring MD visit date: 7/23/19  DOI/onset: one year ago  Mechanism of injury:Insidious onset of pain  DOS none  Previous treatment: CSI on L - helpful in pain and swelling reduction  Imaging: xray  Chief Complaint:   Pain with walking, driving, sit to stand, stairs   Pain: best 4 /10, worst 7/10 medial Described as: sharp, aching Alleviated by: rest Frequency: constant Progression of symptoms since initial onset: improving Time of day when pain is worse: not related  Sleeping: sometimes affected    Occupation: PCA  Job duties:  Sitting, standing    [place] HEP/exercise [place], [place] press, [place]  Patient's goals are reduce pain    Pertinent PMH: none   General Health Reported by Patient: excellent  Return to MD:  PRN         Lower Extremity Exam    Dynamic Movement Screen:  2 leg stance: WNL  2 leg squat:Reduced squat depth d/t reported pain at knee      1 leg stance:   Right: proprioceptive challenge  Left: proprioceptive challenge      Gait: antalgic    Patellofemoral Joint Examination:  Test Right Left   Patellar Orientation:   90 deg flexion neutral neutral   OKC Patellar Tracking Crepitus Crepitus   Patellar Orientation:  0 deg flexion neutral neutral   Quadrant Mobility (Med:Lat) 1:1  1:1   Effusion 0 0   Quad Activation Fair Fair     Knee Joint AROM   Right Left Difference   Hyperextension 0 deg 0 deg 0 deg   Extension 0 deg 0 deg 0 deg   Flexion 140 deg 140 deg 0  deg      Palpation:   Tender to palpation at the following structures: lateral patellar border, medial joint line and lateral joint line    Hip Joint PROM Screen   Right Left Difference   Flex 100 deg 100 deg 0 deg   ER 50 deg 50 deg 0 deg   IR 30 deg 30 deg 0 deg     Lower Extremity Muscle Strength (x/5)   Right Left   Hip ABD 4/5 4/5     Lower Extremity Flexibility Screen:   Right Left   Gastroc/ Soleus ++ +   - = normal  + = mild tightness  ++ = moderate tightness  +++ = significant tightness    Foot Screen:   neutral calcaneal orientation                                                                                                                                                                                                                                                                                                                                                                                                                                                                                                                                                                                                                                                                                                                                                                                                                                                                                                                                                                                                                                                                                                                                                                                                                                                                                                                                                                                                                                                                                                                         Objective:  System    Physical Exam    General     ROS    Assessment/Plan:    Patient is a 44 year old female with both sides knee complaints.    Patient has the following significant findings with corresponding treatment plan.                Diagnosis 1:  Knee pain    Pain -  hot/cold therapy, US, electric stimulation, manual therapy, splint/taping/bracing/orthotics, self management, education and home program  Decreased ROM/flexibility - manual therapy and therapeutic exercise  Decreased joint mobility - manual therapy and therapeutic exercise  Decreased strength - therapeutic exercise and therapeutic activities  Impaired balance - neuro re-education and therapeutic activities  Decreased proprioception - neuro re-education and therapeutic activities  Impaired gait - gait training  Impaired muscle performance - neuro re-education  Decreased function - therapeutic activities    Therapy Evaluation Codes:   1) History comprised of:   Personal factors that impact the plan of care:      None.    Comorbidity factors that impact the plan of care are:      None.     Medications impacting care: None.  2) Examination of Body Systems comprised of:   Body structures and functions that impact the plan of care:      Knee.   Activity limitations that impact the plan of care are:      Sitting, Squatting/kneeling, Stairs, Standing and Walking.  3) Clinical presentation characteristics are:   Stable/Uncomplicated.  4) Decision-Making    Low complexity using standardized patient assessment instrument and/or measureable assessment of functional outcome.  Cumulative Therapy Evaluation is: Low complexity.    Previous and current functional limitations:  (See Goal Flow Sheet for this information)    Short term and Long term goals: (See Goal Flow Sheet for this information)     Communication ability:  Patient has an  for communication clarity.  Treatment Explanation - The  following has been discussed with the patient:   RX ordered/plan of care  Anticipated outcomes  Possible risks and side effects  This patient would benefit from PT intervention to resume normal activities.   Rehab potential is good.    Frequency:  1 X week, once daily  Duration:  for 6 weeks  Discharge Plan:  Achieve all LTG.  Independent in home treatment program.  Reach maximal therapeutic benefit.    Please refer to the daily flowsheet for treatment today, total treatment time and time spent performing 1:1 timed codes.

## 2019-08-14 ENCOUNTER — THERAPY VISIT (OUTPATIENT)
Dept: PHYSICAL THERAPY | Facility: CLINIC | Age: 44
End: 2019-08-14
Payer: COMMERCIAL

## 2019-08-14 DIAGNOSIS — M25.562 CHRONIC PAIN OF LEFT KNEE: Primary | ICD-10-CM

## 2019-08-14 DIAGNOSIS — G89.29 CHRONIC PAIN OF LEFT KNEE: Primary | ICD-10-CM

## 2019-08-14 PROCEDURE — 97110 THERAPEUTIC EXERCISES: CPT | Mod: GP | Performed by: PHYSICAL THERAPY ASSISTANT

## 2019-08-14 PROCEDURE — 97112 NEUROMUSCULAR REEDUCATION: CPT | Mod: GP | Performed by: PHYSICAL THERAPY ASSISTANT

## 2019-08-21 ENCOUNTER — THERAPY VISIT (OUTPATIENT)
Dept: PHYSICAL THERAPY | Facility: CLINIC | Age: 44
End: 2019-08-21
Payer: COMMERCIAL

## 2019-08-21 DIAGNOSIS — M25.562 KNEE PAIN, BILATERAL: ICD-10-CM

## 2019-08-21 DIAGNOSIS — M25.561 KNEE PAIN, BILATERAL: ICD-10-CM

## 2019-08-21 PROCEDURE — 97112 NEUROMUSCULAR REEDUCATION: CPT | Mod: GP | Performed by: PHYSICAL THERAPY ASSISTANT

## 2019-08-21 PROCEDURE — 97110 THERAPEUTIC EXERCISES: CPT | Mod: GP | Performed by: PHYSICAL THERAPY ASSISTANT

## 2019-11-01 ENCOUNTER — ONCOLOGY VISIT (OUTPATIENT)
Dept: ONCOLOGY | Facility: CLINIC | Age: 44
End: 2019-11-01
Attending: FAMILY MEDICINE
Payer: COMMERCIAL

## 2019-11-01 DIAGNOSIS — R73.03 PREDIABETES: Primary | ICD-10-CM

## 2019-11-01 DIAGNOSIS — E66.9 OBESITY: ICD-10-CM

## 2019-11-01 PROCEDURE — 97802 MEDICAL NUTRITION INDIV IN: CPT | Mod: ZF | Performed by: DIETITIAN, REGISTERED

## 2019-11-01 NOTE — PROGRESS NOTES
"CLINICAL NUTRITION SERVICES - ASSESSMENT NOTE    Juanita Salgado 44 year old referred for MNT related to obesity    Time Spent: 30 minutes (patient arrived late)  Visit Type: initial  Referring Physician: Marv Romeo accompanied by: Anika     NUTRITION HISTORY  Current diet: General      Juanita presents today with desire to lose weight.    She reports that she had been able to lose ~30 lb in the way past by portion control and exercise.   She has regained some of that weight and would like a plan to lose weight and improve health.  She has been increasing her intake of fruits and vegetables and lean sea food.    She tells me that she is a single mom to 5 kids and finds it challenging to prepare meals and eat healthy but would really like to get back on a healthy routine.     Diet Recall  Breakfast Oatmeal with 1/2 banana   Lunch 3 oz fish or shrimp with vegetables and rice OR sandwich and fries   Dinner 3 oz chicken or seafood with vegetables and bread   Snacks fruit   Beverages 1 cup tea with <1 tsp sugar     ANTHROPOMETRICS  Height: 66\"  Weight: 208 lbs/94kg  BMI: 33  Weight Status:  Obesity Grade I BMI 30-34.9  IBW: 130 lbs  % IBW: 160%  Weight History: down 20 lb x past 2 years  Wt Readings from Last 10 Encounters:   07/23/19 94.7 kg (208 lb 12.8 oz)   07/10/19 95.1 kg (209 lb 9.6 oz)   06/18/19 91.2 kg (201 lb)   04/09/19 96.2 kg (212 lb)   01/29/19 97.5 kg (215 lb)   01/22/19 98.1 kg (216 lb 3.2 oz)   12/05/18 101.8 kg (224 lb 6.4 oz)   11/27/18 104.4 kg (230 lb 3.2 oz)   11/01/18 107 kg (236 lb)   09/11/18 103.5 kg (228 lb 3.2 oz)     Dosing Weight: 149 lb/68kg    Medications/vitamins/minerals/herbals:   Reviewed    Labs:  Labs reviewed    ASSESSED NUTRITION NEEDS:  Estimated Energy Needs: 1360 kcals (20Kcal/Kg)  Justification: obese  Estimated Protein Needs: 68 grams protein (1 g pro/Kg)  Justification: maintenance  Estimated Fluid Needs: 1500  mL   Justification: maintenance    NUTRITION " "DIAGNOSIS:  Obesity related to self-monitoring deficit, excessive caloric intake AEB diet recall BMI >35    INTERVENTIONS  Provided written & verbal education:   Discussed dietary and behavioral modifications to promote weight loss (portion control, meal consistency, measuring foods, 9\"portion plate method, fiber sources, protein sources, eating pace, exercise and avoidance of disordered eating patterns (skipping meals).    Encouraged pt to limit calorie intake to <1400/day.  Encouraged her to journal/track daily intake for accountability.      Provided pt with corresponding education materials/handouts on:  1400kcal meal plan, List of quick meal ideas and resources for healthful living websites   and Protein Sources.      Pt verbalize understanding of materials provided during consult.   Patient Understanding: Excellent  Expected Compliance: Excellent    Goals  1.  Aim 1400kcal /day   2.  Start tracking daily intake on MFP or ruy/website of choice  3. Weight loss (0.5-1.0 lb /week desirable)      Follow-Up Plans: Pt has RD contact information for questions.    Pt to follow up with RD in 1-2 weeks at the time of treatment.     MONITORING AND EVALUATION:  -Food intake  -Weight trends    Ana Lilia Navarro, [facility]ID[facility], LDN        "

## 2019-11-01 NOTE — LETTER
"11/1/2019       RE: Juanita Salgado  [address]  [facility] [id number]     Dear Colleague,    Thank you for referring your patient, Juanita Salgado, to the [facility]. Please see a copy of my visit note below.    CLINICAL NUTRITION SERVICES - ASSESSMENT NOTE    Juanita Salgado 44 year old referred for MNT related to obesity    Time Spent: 30 minutes (patient arrived late)  Visit Type:  initial  Referring Physician:  Marv  Pt accompanied by:  Anika     NUTRITION HISTORY  Current diet: General      Juanita presents today with desire to lose weight.    She reports that she had been able to lose ~30 lb in the way past by portion control and exercise.   She has regained some of that weight and would like a plan to lose weight and improve health.  She has been increasing her intake of fruits and vegetables and lean sea food.    She tells me that she is a single mom to 5 kids and finds it challenging to prepare meals and eat healthy but would really like to get back on a healthy routine.     Diet Recall  Breakfast Oatmeal with 1/2 banana   Lunch 3 oz fish or shrimp with vegetables and rice OR sandwich and fries   Dinner 3 oz chicken or seafood with vegetables and bread   Snacks fruit   Beverages 1 cup tea with <1 tsp sugar     ANTHROPOMETRICS  Height: 66\"  Weight: 208 lbs/94kg  BMI: 33  Weight Status:  Obesity Grade I BMI 30-34.9  IBW: 130 lbs  % IBW: 160%  Weight History: down 20 lb x past 2 years  Wt Readings from Last 10 Encounters:   07/23/19 94.7 kg (208 lb 12.8 oz)   07/10/19 95.1 kg (209 lb 9.6 oz)   06/18/19 91.2 kg (201 lb)   04/09/19 96.2 kg (212 lb)   01/29/19 97.5 kg (215 lb)   01/22/19 98.1 kg (216 lb 3.2 oz)   12/05/18 101.8 kg (224 lb 6.4 oz)   11/27/18 104.4 kg (230 lb 3.2 oz)   11/01/18 107 kg (236 lb)   09/11/18 103.5 kg (228 lb 3.2 oz)     Dosing Weight: 149 lb/68kg    Medications/vitamins/minerals/herbals:   Reviewed    Labs:  Labs reviewed    ASSESSED NUTRITION " "NEEDS:  Estimated Energy Needs: 1360 kcals (20Kcal/Kg)  Justification: obese  Estimated Protein Needs: 68 grams protein (1 g pro/Kg)  Justification: maintenance  Estimated Fluid Needs: 1500  mL   Justification: maintenance    NUTRITION DIAGNOSIS:  Obesity related to self-monitoring deficit, excessive caloric intake AEB diet recall BMI >35    INTERVENTIONS  Provided written & verbal education:   Discussed dietary and behavioral modifications to promote weight loss (portion control, meal consistency, measuring foods, 9\"portion plate method, fiber sources, protein sources, eating pace, exercise and avoidance of disordered eating patterns (skipping meals).    Encouraged pt to limit calorie intake to <1400/day.  Encouraged her to journal/track daily intake for accountability.      Provided pt with corresponding education materials/handouts on:  1400kcal meal plan, List of quick meal ideas and resources for healthful living websites   and Protein Sources.      Pt verbalize understanding of materials provided during consult.   Patient Understanding: Excellent  Expected Compliance: Excellent    Goals  1.  Aim 1400kcal /day   2.  Start tracking daily intake on MFP or ruy/website of choice  3. Weight loss (0.5-1.0 lb /week desirable)      Follow-Up Plans: Pt has RD contact information for questions.    Pt to follow up with RD in 1-2 weeks at the time of treatment.     MONITORING AND EVALUATION:  -Food intake  -Weight trends    Ana Lilia Navarro, NAVID[facility], LDN          "

## 2020-04-01 PROBLEM — M25.561 KNEE PAIN, BILATERAL: Status: RESOLVED | Noted: 2019-08-21 | Resolved: 2020-04-01

## 2020-04-01 PROBLEM — M25.562 KNEE PAIN, BILATERAL: Status: RESOLVED | Noted: 2019-08-21 | Resolved: 2020-04-01

## 2020-04-01 NOTE — PROGRESS NOTES
DISCHARGE SUMMARY    Juanita Salgado was seen 3 times for evaluation and treatment.  Patient did not return for further treatment and current status is unknown.  Due to short treatment duration, no objective or functional changes were made.  Please see goal flow sheet from episode noted date below and initial evaluation for further information.  Patient is discharged from therapy and therapy episode is resolved as of 04/01/20.      No linked episodes

## 2021-09-22 ENCOUNTER — LAB REQUISITION (OUTPATIENT)
Dept: LAB | Facility: CLINIC | Age: 46
End: 2021-09-22
Payer: COMMERCIAL

## 2021-09-22 DIAGNOSIS — Z00.00 ENCOUNTER FOR GENERAL ADULT MEDICAL EXAMINATION WITHOUT ABNORMAL FINDINGS: ICD-10-CM

## 2021-09-22 LAB
CHOLEST SERPL-MCNC: 260 MG/DL
FASTING STATUS PATIENT QL REPORTED: YES
HBV SURFACE AB SERPL IA-ACNC: 0 M[IU]/ML
HCV AB SERPL QL IA: NONREACTIVE
HDLC SERPL-MCNC: 31 MG/DL
LDLC SERPL CALC-MCNC: 108 MG/DL
LDLC SERPL CALC-MCNC: ABNORMAL MG/DL
NONHDLC SERPL-MCNC: 229 MG/DL
TRIGL SERPL-MCNC: 530 MG/DL

## 2021-09-22 PROCEDURE — 86704 HEP B CORE ANTIBODY TOTAL: CPT | Mod: ORL | Performed by: FAMILY MEDICINE

## 2021-09-22 PROCEDURE — 86803 HEPATITIS C AB TEST: CPT | Mod: ORL | Performed by: FAMILY MEDICINE

## 2021-09-22 PROCEDURE — 83721 ASSAY OF BLOOD LIPOPROTEIN: CPT | Mod: ORL | Performed by: FAMILY MEDICINE

## 2021-09-22 PROCEDURE — 87340 HEPATITIS B SURFACE AG IA: CPT | Mod: ORL | Performed by: FAMILY MEDICINE

## 2021-09-22 PROCEDURE — 80061 LIPID PANEL: CPT | Mod: ORL | Performed by: FAMILY MEDICINE

## 2021-09-22 PROCEDURE — 86706 HEP B SURFACE ANTIBODY: CPT | Mod: ORL | Performed by: FAMILY MEDICINE

## 2021-09-23 LAB
HBV CORE AB SERPL QL IA: REACTIVE
HBV SURFACE AG SERPL QL IA: REACTIVE

## 2021-09-30 ENCOUNTER — LAB REQUISITION (OUTPATIENT)
Dept: LAB | Facility: CLINIC | Age: 46
End: 2021-09-30
Payer: COMMERCIAL

## 2021-09-30 DIAGNOSIS — E11.9 TYPE 2 DIABETES MELLITUS WITHOUT COMPLICATIONS (H): ICD-10-CM

## 2021-09-30 DIAGNOSIS — B19.10 UNSPECIFIED VIRAL HEPATITIS B WITHOUT HEPATIC COMA: ICD-10-CM

## 2021-09-30 LAB
ALBUMIN SERPL-MCNC: 3.3 G/DL (ref 3.4–5)
ALP SERPL-CCNC: 56 U/L (ref 40–150)
ALT SERPL W P-5'-P-CCNC: 26 U/L (ref 0–50)
AST SERPL W P-5'-P-CCNC: 25 U/L (ref 0–45)
BILIRUB DIRECT SERPL-MCNC: <0.1 MG/DL (ref 0–0.2)
BILIRUB SERPL-MCNC: 0.3 MG/DL (ref 0.2–1.3)
CREAT UR-MCNC: 172 MG/DL
MICROALBUMIN UR-MCNC: 159 MG/L
MICROALBUMIN/CREAT UR: 92.44 MG/G CR (ref 0–25)
PROT SERPL-MCNC: 8.3 G/DL (ref 6.8–8.8)

## 2021-09-30 PROCEDURE — 80076 HEPATIC FUNCTION PANEL: CPT | Mod: ORL | Performed by: INTERNAL MEDICINE

## 2021-09-30 PROCEDURE — 87517 HEPATITIS B DNA QUANT: CPT | Mod: ORL | Performed by: INTERNAL MEDICINE

## 2021-09-30 PROCEDURE — 82043 UR ALBUMIN QUANTITATIVE: CPT | Mod: ORL | Performed by: INTERNAL MEDICINE

## 2021-10-01 LAB
HBV DNA SERPL NAA+PROBE-ACNC: 8351 IU/ML
HBV DNA SERPL NAA+PROBE-LOG IU: 3.9 {LOG_IU}/ML

## 2023-05-28 ENCOUNTER — APPOINTMENT (OUTPATIENT)
Dept: GENERAL RADIOLOGY | Facility: CLINIC | Age: 48
End: 2023-05-28
Attending: EMERGENCY MEDICINE
Payer: COMMERCIAL

## 2023-05-28 ENCOUNTER — APPOINTMENT (OUTPATIENT)
Dept: CT IMAGING | Facility: CLINIC | Age: 48
End: 2023-05-28
Attending: EMERGENCY MEDICINE
Payer: COMMERCIAL

## 2023-05-28 ENCOUNTER — HOSPITAL ENCOUNTER (EMERGENCY)
Facility: CLINIC | Age: 48
Discharge: HOME | End: 2023-05-28
Attending: EMERGENCY MEDICINE | Admitting: EMERGENCY MEDICINE
Payer: COMMERCIAL

## 2023-05-28 VITALS
DIASTOLIC BLOOD PRESSURE: 86 MMHG | RESPIRATION RATE: 16 BRPM | OXYGEN SATURATION: 100 % | TEMPERATURE: 98.1 F | SYSTOLIC BLOOD PRESSURE: 163 MMHG | HEART RATE: 89 BPM
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
## 2023-05-28 DIAGNOSIS — M54.6 ACUTE BILATERAL THORACIC BACK PAIN: ICD-10-CM

## 2023-05-28 DIAGNOSIS — M54.2 NECK PAIN: ICD-10-CM

## 2023-05-28 PROCEDURE — 99284 EMERGENCY DEPT VISIT MOD MDM: CPT | Performed by: EMERGENCY MEDICINE

## 2023-05-28 PROCEDURE — 72100 X-RAY EXAM L-S SPINE 2/3 VWS: CPT | Mod: 26 | Performed by: RADIOLOGY

## 2023-05-28 PROCEDURE — 72125 CT NECK SPINE W/O DYE: CPT

## 2023-05-28 PROCEDURE — 72100 X-RAY EXAM L-S SPINE 2/3 VWS: CPT

## 2023-05-28 PROCEDURE — 99285 EMERGENCY DEPT VISIT HI MDM: CPT | Mod: 25 | Performed by: EMERGENCY MEDICINE

## 2023-05-28 PROCEDURE — 71046 X-RAY EXAM CHEST 2 VIEWS: CPT | Mod: 26 | Performed by: RADIOLOGY

## 2023-05-28 PROCEDURE — 71046 X-RAY EXAM CHEST 2 VIEWS: CPT

## 2023-05-28 PROCEDURE — 71250 CT THORAX DX C-: CPT

## 2023-05-28 PROCEDURE — 72125 CT NECK SPINE W/O DYE: CPT | Mod: 26 | Performed by: RADIOLOGY

## 2023-05-28 PROCEDURE — 71250 CT THORAX DX C-: CPT | Mod: 26 | Performed by: RADIOLOGY

## 2023-05-28 PROCEDURE — 250N000013 HC RX MED GY IP 250 OP 250 PS 637: Performed by: EMERGENCY MEDICINE

## 2023-05-28 RX ORDER — NAPROXEN 500 MG/1
500 TABLET ORAL 2 TIMES DAILY WITH MEALS
Qty: 16 TABLET | Refills: 0 | Status: SHIPPED | OUTPATIENT
Start: 2023-05-28 | End: 2023-06-05

## 2023-05-28 RX ORDER — IBUPROFEN 200 MG
400 TABLET ORAL ONCE
Status: COMPLETED | OUTPATIENT
Start: 2023-05-28 | End: 2023-05-28

## 2023-05-28 RX ORDER — TRAMADOL HYDROCHLORIDE 50 MG/1
50 TABLET ORAL ONCE
Status: COMPLETED | OUTPATIENT
Start: 2023-05-28 | End: 2023-05-28

## 2023-05-28 RX ADMIN — TRAMADOL HYDROCHLORIDE 50 MG: 50 TABLET, COATED ORAL at 22:04

## 2023-05-28 RX ADMIN — IBUPROFEN 400 MG: 200 TABLET, FILM COATED ORAL at 22:04

## 2023-05-28 ASSESSMENT — ACTIVITIES OF DAILY LIVING (ADL)
ADLS_ACUITY_SCORE: 35
ADLS_ACUITY_SCORE: 35
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
## 2023-05-29 NOTE — DISCHARGE INSTRUCTIONS
Please make an appointment to follow up with Your Primary Care Provider in 2-3 days unless symptoms completely resolve.  Please take the prescribed medication as instructed.  If your symptoms significantly worsen please come back to the emergency department.
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
## 2025-04-11 NOTE — PROGRESS NOTES
42 year old female with LMP 04/26/2017 presents for gynecologic ultrasound indicated by dysmenorrhea, pelvic pain.  This study was done transvaginally.    Uterine findings:   Presence: Visible Size: Normal 5.5x6.1x4.6 cm.  Endometrium = 6.8 mm.   Cx length = 38.5 mm.      Flexion:  Midposition Position: Midline Margins: Smooth Shape: Normal   Contour: Regular Texture: Homogeneous Cavity: can't exclude possibility of polyp Masses: Normal    Pelvic findings:    Right Adnexa: Normal   Left Adnexa: Normal   Bladder:  Normal         Cul - de - sac fluid: None    Ovarian follicles:   Right ovary:  1.7x2.0x1.9cm.     0 follicles          Left ovary:  3.1x3.3x2.3cm.     1 follicles     Size(s):  1.6 x 1.4 x 1.5mm      Comments:  Endometrial cavity with a possible polyp-consider Endosee or SIS if clinically indicated.  Otherwise normal pelvic ultrasound.  Further studies as clinically indicated.    ELA Hernández MD, FACOG       guidewire removed.